# Patient Record
Sex: FEMALE | Race: WHITE | NOT HISPANIC OR LATINO | Employment: OTHER | URBAN - METROPOLITAN AREA
[De-identification: names, ages, dates, MRNs, and addresses within clinical notes are randomized per-mention and may not be internally consistent; named-entity substitution may affect disease eponyms.]

---

## 2017-10-10 ENCOUNTER — ALLIED HEALTH/NURSE VISIT (OUTPATIENT)
Dept: NURSING | Facility: CLINIC | Age: 37
End: 2017-10-10
Payer: COMMERCIAL

## 2017-10-10 DIAGNOSIS — Z23 NEED FOR PROPHYLACTIC VACCINATION AND INOCULATION AGAINST INFLUENZA: Primary | ICD-10-CM

## 2017-10-10 PROCEDURE — 90686 IIV4 VACC NO PRSV 0.5 ML IM: CPT

## 2017-10-10 PROCEDURE — 99207 ZZC NO CHARGE NURSE ONLY: CPT

## 2017-10-10 PROCEDURE — 90471 IMMUNIZATION ADMIN: CPT

## 2017-10-10 NOTE — MR AVS SNAPSHOT
"              After Visit Summary   10/10/2017    Chapis Zepeda    MRN: [id number]           Patient Information     Date Of Birth          1980        Visit Information        Provider Department      10/10/2017 1:00 PM BM NURSE [facility]        Today's Diagnoses     Need for prophylactic vaccination and inoculation against influenza    -  1       Follow-ups after your visit        Your next 10 appointments already scheduled     Oct 10, 2017  1:00 PM CDT   Nurse Only with BM NURSE   [facility]    [address]  [facility] [address] [phone]              Who to contact     If you have questions or need follow up information about today's clinic visit or your schedule please contact [facility] directly at [phone].  Normal or non-critical lab and imaging results will be communicated to you by Mobile Content [employer], letter or phone within 4 business days after the clinic has received the results. If you do not hear from us within 7 days, please contact the clinic through [employer] or phone. If you have a critical or abnormal lab result, we will notify you by phone as soon as possible.  Submit refill requests through [employer] or call your pharmacy and they will forward the refill request to us. Please allow 3 business days for your refill to be completed.          Additional Information About Your Visit        MyChart Information     [employer] lets you send messages to your doctor, view your test results, renew your prescriptions, schedule appointments and more. To sign up, go to [email or web address] . Click on \"Log in\" on the left side of the screen, which will take you to the Welcome page. Then click on \"Sign up Now\" on the right side of the page.     You will be asked to enter the access code listed below, as well as some personal information. " Please follow the directions to create your username and password.     Your access code is: [email or web address]  Expires: 2018 12:52 PM     Your access code will  in 90 days. If you need help or a new code, please call your [place] clinic or [phone].        Care EveryWhere ID     This is your Care EveryWhere ID. This could be used by other organizations to access your [place] medical records  [id number]         Blood Pressure from Last 3 Encounters:   16 111/60   10/30/12 111/71    Weight from Last 3 Encounters:   16 150 lb (68 kg)   10/30/12 145 lb 4 oz (65.9 kg)              We Performed the Following     FLU VAC, SPLIT VIRUS IM > 3 YO (QUADRIVALENT) [43892]     Vaccine Administration, Initial [92706]        Primary Care Provider    None Specified       No primary provider on file.        Equal Access to Services     [facility]: Hadii estella shino Marylin, waaxda luqadaha, qaybta kaalmada ademehrdadyada, marina rodriges . So [facility] [phone].    ATENCIÓN: Si habla español, tiene a garcia disposición servicios gratuitos de asistencia lingüística. hCristopher al [phone].    We comply with applicable federal civil rights laws and [place] laws. We do not discriminate on the basis of race, color, national origin, age, disability, sex, sexual orientation, or gender identity.            Thank you!     Thank you for choosing [facility]  for your care. Our goal is always to provide you with excellent care. Hearing back from our patients is one way we can continue to improve our services. Please take a few minutes to complete the written survey that you may receive in the mail after your visit with us. Thank you!             Your Updated Medication List - Protect others around you: Learn how to safely use, store and throw away your medicines at www.disposemymeds.org.          This list is accurate as of: 10/10/17 12:52 PM.  Always use your  most recent med list.                   Brand Name Dispense Instructions for use Diagnosis    doxylamine 25 MG Tabs tablet    UNISOM     Take 25 mg by mouth At Bedtime        VITAMIN E/FOLIC ACID/B-6/B-12 PO

## 2017-10-10 NOTE — PROGRESS NOTES
Injectable Influenza Immunization Documentation    1.  Is the person to be vaccinated sick today?   No    2. Does the person to be vaccinated have an allergy to a component   of the vaccine?   No    3. Has the person to be vaccinated ever had a serious reaction   to influenza vaccine in the past?   No    4. Has the person to be vaccinated ever had Guillain-Barré syndrome?   No    Form completed by Tiffany SEBASTIAN

## 2018-05-23 ENCOUNTER — OFFICE VISIT (OUTPATIENT)
Dept: FAMILY MEDICINE | Facility: CLINIC | Age: 38
End: 2018-05-23
Payer: COMMERCIAL

## 2018-05-23 VITALS
OXYGEN SATURATION: 100 % | DIASTOLIC BLOOD PRESSURE: 68 MMHG | HEART RATE: 88 BPM | RESPIRATION RATE: 16 BRPM | WEIGHT: 154.6 LBS | SYSTOLIC BLOOD PRESSURE: 114 MMHG | HEIGHT: 68 IN | TEMPERATURE: 98.3 F | BODY MASS INDEX: 23.43 KG/M2

## 2018-05-23 DIAGNOSIS — R07.0 THROAT PAIN: Primary | ICD-10-CM

## 2018-05-23 LAB
DEPRECATED S PYO AG THROAT QL EIA: NORMAL
SPECIMEN SOURCE: NORMAL

## 2018-05-23 PROCEDURE — 87081 CULTURE SCREEN ONLY: CPT | Performed by: FAMILY MEDICINE

## 2018-05-23 PROCEDURE — 99213 OFFICE O/P EST LOW 20 MIN: CPT | Performed by: FAMILY MEDICINE

## 2018-05-23 PROCEDURE — 87880 STREP A ASSAY W/OPTIC: CPT | Performed by: FAMILY MEDICINE

## 2018-05-23 NOTE — MR AVS SNAPSHOT
"              After Visit Summary   2018    Chapis Zepeda    MRN: [id number]           Patient Information     Date Of Birth          1980        Visit Information        Provider Department      2018 9:30 AM Phyllis Jenkins MD [facility]        Today's Diagnoses     Throat pain    -  1       Follow-ups after your visit        Who to contact     If you have questions or need follow up information about today's clinic visit or your schedule please contact [facility] directly at [phone].  Normal or non-critical lab and imaging results will be communicated to you by [employer], letter or phone within 4 business days after the clinic has received the results. If you do not hear from us within 7 days, please contact the clinic through [employer] or phone. If you have a critical or abnormal lab result, we will notify you by phone as soon as possible.  Submit refill requests through [employer] or call your pharmacy and they will forward the refill request to us. Please allow 3 business days for your refill to be completed.          Additional Information About Your Visit        MyChart Information     [employer] lets you send messages to your doctor, view your test results, renew your prescriptions, schedule appointments and more. To sign up, go to [email or web address] . Click on \"Log in\" on the left side of the screen, which will take you to the Welcome page. Then click on \"Sign up Now\" on the right side of the page.     You will be asked to enter the access code listed below, as well as some personal information. Please follow the directions to create your username and password.     Your access code is: [email or web address]  Expires: 2018 10:06 AM     Your access code will  in 90 days. If you need help or a new code, please call your [facility] or [phone].        Care EveryWhere ID     This is your Care EveryWhere ID. This could be used by other organizations to access your " "[place] medical records  [id number]        Your Vitals Were     Pulse Temperature Respirations Height Pulse Oximetry Breastfeeding?    88 98.3  F (36.8  C) (Oral) 16 5' 8\" (1.727 m) 100% No    BMI (Body Mass Index)                   23.51 kg/m2            Blood Pressure from Last 3 Encounters:   05/23/18 114/68   05/04/16 111/60   10/30/12 111/71    Weight from Last 3 Encounters:   05/23/18 154 lb 9.6 oz (70.1 kg)   05/04/16 150 lb (68 kg)   10/30/12 145 lb 4 oz (65.9 kg)              We Performed the Following     Beta strep group A culture     Rapid strep screen        Primary Care Provider Office Phone # Fax #    Annette Asuncion Meza [phone] [address] [address]        Equal Access to Services     [facility] : Hadii aad ku hadasho Soana, waaxda luqadaha, qaybta kaalmada ademehrdadyada, marina rodriges . So [facility] [phone].    ATENCIÓN: Si habla español, tiene a garcia disposición servicios gratuitos de asistencia lingüística. Llame al [phone].    We comply with applicable federal civil rights laws and [place] laws. We do not discriminate on the basis of race, color, national origin, age, disability, sex, sexual orientation, or gender identity.            Thank you!     Thank you for choosing [facility]  for your care. Our goal is always to provide you with excellent care. Hearing back from our patients is one way we can continue to improve our services. Please take a few minutes to complete the written survey that you may receive in the mail after your visit with us. Thank you!             Your Updated Medication List - Protect others around you: Learn how to safely use, store and throw away your medicines at www.disposemymeds.org.          This list is accurate as of 5/23/18 10:06 AM.  Always use your most recent med list.                   Brand Name Dispense Instructions for use Diagnosis    doxylamine 25 MG Tabs tablet    " UNISOM     Take 25 mg by mouth At Bedtime        SPIRONOLACTONE PO           VITAMIN E/FOLIC ACID/B-6/B-12 PO

## 2018-05-23 NOTE — PROGRESS NOTES
SUBJECTIVE:   Chapis Zepeda is a 37 year old female who presents to clinic today for the following health issues:      Chief Complaint   Patient presents with     Pharyngitis     Pt's whole family has recently had strep- pt has had a sore throat x5 days         SORE THROAT      Duration: 5 days    Description (location/character/radiation): throat    Intensity:  moderate    Accompanying signs and symptoms: none    History (similar episodes/previous evaluation): None    Precipitating or alleviating factors: whole family has recently had strep    Therapies tried and outcome: None       Problem list and histories reviewed & adjusted, as indicated.  Additional history: as documented    Patient Active Problem List   Diagnosis     Seasonal allergic rhinitis     History reviewed. No pertinent surgical history.    Social History   Substance Use Topics     Smoking status: Never Smoker     Smokeless tobacco: Never Used     Alcohol use Yes     Family History   Problem Relation Age of Onset     CANCER Maternal Grandmother      Lung     CANCER Maternal Grandfather      HEART DISEASE Paternal Grandfather          Current Outpatient Prescriptions   Medication Sig Dispense Refill     doxylamine (UNISOM) 25 MG TABS Take 25 mg by mouth At Bedtime       Multiple Vitamin (VITAMIN E/FOLIC ACID/B-6/B-12 PO)        SPIRONOLACTONE PO        No Known Allergies  Recent Labs   Lab Test  05/04/16   2245   CR  0.70   GFRESTIMATED  >90  Non  GFR Calc     GFRESTBLACK  >90   GFR Calc     POTASSIUM  3.7      BP Readings from Last 3 Encounters:   05/23/18 114/68   05/04/16 111/60   10/30/12 111/71    Wt Readings from Last 3 Encounters:   05/23/18 154 lb 9.6 oz (70.1 kg)   05/04/16 150 lb (68 kg)   10/30/12 145 lb 4 oz (65.9 kg)                  Labs reviewed in EPIC    Reviewed and updated as needed this visit by clinical staff  Tobacco  Allergies  Meds  Med Hx  Surg Hx  Fam Hx  Soc Hx      Reviewed and  "updated as needed this visit by Provider         ROS:  Constitutional, HEENT, cardiovascular, pulmonary, GI, , musculoskeletal, neuro, skin, endocrine and psych systems are negative, except as otherwise noted.    OBJECTIVE:     /68  Pulse 88  Temp 98.3  F (36.8  C) (Oral)  Resp 16  Ht 5' 8\" (1.727 m)  Wt 154 lb 9.6 oz (70.1 kg)  SpO2 100%  Breastfeeding? No  BMI 23.51 kg/m2  Body mass index is 23.51 kg/(m^2).  GENERAL: healthy, alert and no distress  EYES: Eyes grossly normal to inspection, PERRL and conjunctivae and sclerae normal  HENT: ear canals and TM's normal, nose and mouth without ulcers or lesions  NECK: no adenopathy, no asymmetry, masses, or scars and thyroid normal to palpation  RESP: lungs clear to auscultation - no rales, rhonchi or wheezes  CV: regular rate and rhythm, normal S1 S2, no S3 or S4, no murmur, click or rub, no peripheral edema and peripheral pulses strong  ABDOMEN: soft, nontender, no hepatosplenomegaly, no masses and bowel sounds normal  MS: no gross musculoskeletal defects noted, no edema    Diagnostic Test Results:  Results for orders placed or performed in visit on 05/23/18 (from the past 24 hour(s))   Rapid strep screen   Result Value Ref Range    Specimen Description Throat     Rapid Strep A Screen       NEGATIVE: No Group A streptococcal antigen detected by immunoassay, await culture report.       ASSESSMENT/PLAN:         1. 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  - Rapid strep screen  - Beta strep group A culture    RTC if no improving or worsening.  Pt is aware  and comfortable with the current plan.      Phyllis Jenkins MD  [facility]  "

## 2018-05-24 LAB
BACTERIA SPEC CULT: NORMAL
SPECIMEN SOURCE: NORMAL

## 2018-05-28 ENCOUNTER — HEALTH MAINTENANCE LETTER (OUTPATIENT)
Age: 38
End: 2018-05-28

## 2018-11-29 ENCOUNTER — OFFICE VISIT (OUTPATIENT)
Dept: FAMILY MEDICINE | Facility: CLINIC | Age: 38
End: 2018-11-29
Payer: COMMERCIAL

## 2018-11-29 VITALS
OXYGEN SATURATION: 98 % | WEIGHT: 152.7 LBS | HEART RATE: 77 BPM | HEIGHT: 68 IN | DIASTOLIC BLOOD PRESSURE: 73 MMHG | TEMPERATURE: 98.1 F | BODY MASS INDEX: 23.14 KG/M2 | SYSTOLIC BLOOD PRESSURE: 114 MMHG | RESPIRATION RATE: 16 BRPM

## 2018-11-29 DIAGNOSIS — N63.0 BREAST NODULE: ICD-10-CM

## 2018-11-29 DIAGNOSIS — Z00.00 ROUTINE HISTORY AND PHYSICAL EXAMINATION OF ADULT: Primary | ICD-10-CM

## 2018-11-29 NOTE — NURSING NOTE
"38 year old  Chief Complaint   Patient presents with     Physical     PAP,  \"BB\" size spot in right breast, noticed Tues, concerns about past hernia       Blood pressure 114/73, pulse 77, temperature 98.1  F (36.7  C), temperature source Oral, resp. rate 16, height 5' 8\" (172.7 cm), weight 152 lb 11.2 oz (69.3 kg), last menstrual period 11/25/2018, SpO2 98 %, not currently breastfeeding. Body mass index is 23.22 kg/(m^2).  BP completed using cuff size:    Patient Active Problem List   Diagnosis     Seasonal allergic rhinitis       Wt Readings from Last 2 Encounters:   11/29/18 152 lb 11.2 oz (69.3 kg)   05/23/18 154 lb 9.6 oz (70.1 kg)     BP Readings from Last 3 Encounters:   11/29/18 114/73   05/23/18 114/68   05/04/16 111/60       No Known Allergies    Current Outpatient Prescriptions   Medication     SPIRONOLACTONE PO     doxylamine (UNISOM) 25 MG TABS     Multiple Vitamin (VITAMIN E/FOLIC ACID/B-6/B-12 PO)     No current facility-administered medications for this visit.        Social History   Substance Use Topics     Smoking status: Never Smoker     Smokeless tobacco: Never Used     Alcohol use Yes         Honoring Choices - Health Care Directive Guide offered to patient at time of visit.    Health Maintenance Due   Topic Date Due     PHQ-2 Q1 YR  07/01/1992     TETANUS IMMUNIZATION (SYSTEM ASSIGNED)  07/01/1998     HIV SCREEN (SYSTEM ASSIGNED)  07/01/1998     PAP SCREENING Q3 YR (SYSTEM ASSIGNED)  07/01/2001     INFLUENZA VACCINE (1) 09/01/2018       Immunization History   Administered Date(s) Administered     Influenza Vaccine IM 3yrs+ 4 Valent IIV4 10/10/2017       No results found for: PAP      Recent Labs   Lab Test  05/04/16   2245   CR  0.70   GFRESTIMATED  >90  Non  GFR Calc     GFRESTBLACK  >90   GFR Calc     POTASSIUM  3.7       PHQ-2 ( 1999 Pfizer) 11/29/2018   Q1: Little interest or pleasure in doing things 0   Q2: Feeling down, depressed or hopeless 0   PHQ-2 Score " 0       No flowsheet data found.    No flowsheet data found.    No flowsheet data found.    Desiree Cat CMA  November 29, 2018 2:10 PM

## 2018-11-29 NOTE — MR AVS SNAPSHOT
After Visit Summary   2018    Chapis Zepeda    MRN: [id number]           Patient Information     Date Of Birth          1980        Visit Information        Provider Department      2018 2:00 PM Amy Sullivan, NP [facility]        Today's Diagnoses     Routine history and physical examination of adult    -  1       Follow-ups after your visit        Future tests that were ordered for you today     Open Future Orders        Priority Expected Expires Ordered    Glucose Fasting (AP [facility]) Routine  2019    MA Diagnostic Digital Bilateral Routine  2019    Lipid panel reflex to direct LDL Fasting Routine  2019            Who to contact     Please call your clinic at [phone] to:    Ask questions about your health    Make or cancel appointments    Discuss your medicines    Learn about your test results    Speak to your doctor            Additional Information About Your Visit        MyChart Information     [employer] is an electronic gateway that provides easy, online access to your medical records. With [employer], you can request a clinic appointment, read your test results, renew a prescription or communicate with your care team.     To sign up for [employer] Systemst visit the website at [email or web address]   You will be asked to enter the access code listed below, as well as some personal information. Please follow the directions to create your username and password.     Your access code is: [email or web address]  Expires: 2019  3:03 PM     Your access code will  in 90 days. If you need help or a new code, please contact your [facility] or call [phone] for assistance.        Care EveryWhere ID     This is your Care EveryWhere ID. This could be used by other organizations to access your [place] medical records  [id number]        Your Vitals Were     Pulse Temperature Respirations  "Height Last Period Pulse Oximetry    77 98.1  F (36.7  C) (Oral) 16 5' 8\" (172.7 cm) 11/25/2018 98%    BMI (Body Mass Index)                   23.22 kg/m2            Blood Pressure from Last 3 Encounters:   11/29/18 114/73   05/23/18 114/68   05/04/16 111/60    Weight from Last 3 Encounters:   11/29/18 152 lb 11.2 oz (69.3 kg)   05/23/18 154 lb 9.6 oz (70.1 kg)   05/04/16 150 lb (68 kg)              We Performed the Following     C FLU VAC QUADRIVALENT SPLIT VIRUS 3+YRS IM        Primary Care Provider Office Phone # Fax #    Annette Meza [phone] [address] [address]        Equal Access to Services     STEPHANIE WHITEHEAD : Hadii aad ku hadashagnieszka Soana, waaxda luqadaha, qaybta kaalmada shyam, marina rodriges . So [facility] [phone].    ATENCIÓN: Si habla español, tiene a garcia disposición servicios gratuitos de asistencia lingüística. Christopher al [phone].    We comply with applicable federal civil rights laws and [place] laws. We do not discriminate on the basis of race, color, national origin, age, disability, sex, sexual orientation, or gender identity.            Thank you!     Thank you for choosing [facility]  for your care. Our goal is always to provide you with excellent care. Hearing back from our patients is one way we can continue to improve our services. Please take a few minutes to complete the written survey that you may receive in the mail after your visit with us. Thank you!             Your Updated Medication List - Protect others around you: Learn how to safely use, store and throw away your medicines at www.disposemymeds.org.          This list is accurate as of 11/29/18  3:03 PM.  Always use your most recent med list.                   Brand Name Dispense Instructions for use Diagnosis    doxylamine 25 MG Tabs tablet    UNISOM     Take 25 mg by mouth At Bedtime        SPIRONOLACTONE PO      Take 75 mg by mouth daily "        VITAMIN E/FOLIC ACID/B-6/B-12 PO

## 2018-11-29 NOTE — LETTER
Ashutosh Nation,    December 6, 2018       TO: Chapis Lew  [address] [address]       DearMs.Duane,    We are writing to inform you of your test results.    Your test results fall within the expected range(s) or remain unchanged from previous results.  Please continue with current treatment plan.    Resulted Orders   Pap imaged thin layer screen with HPV - recommended age 30 - 65 years (select HPV order below)   Result Value Ref Range    PAP NIL     Copath Report         Patient Name: CHAPIS LEW  MR#: [id number]  Specimen #: [id number]  Collected: 11/29/2018  Received: 11/30/2018  Reported: 12/4/2018 08:45  Ordering Phy(s): PORFIRIO HAWK    For improved result formatting, select 'View Enhanced Report Format' under   Linked Documents section.    SPECIMEN/STAIN PROCESS:  Pap imaged thin layer prep screening (Surepath, FocalPoint with guided   screening)       Pap-Cyto x 1, HPV ordered x 1    SOURCE: Cervical, endocervical  ----------------------------------------------------------------   Pap imaged thin layer prep screening (Surepath, FocalPoint with guided   screening)  SPECIMEN ADEQUACY:  Satisfactory for evaluation.  -Transformation zone component present.    CYTOLOGIC INTERPRETATION:    Negative for intraepithelial lesion or malignancy    Electronically signed out by:  STEVIE Shaffer (ASCP)    Processed and screened at [facility]    CLINICAL HISTORY:  LMP: 11/25/2018    Papanicolaou T est Limitations:  Cervical cytology is a screening test with   limited sensitivity; regular  screening is critical for cancer prevention; Pap tests are primarily   effective for the diagnosis/prevention of  squamous cell carcinoma, not adenocarcinomas or other cancers.    TESTING LAB LOCATION:  [facility], [address]  [address] [phone]    COLLECTION  SITE:  Client:  [facility], [place]  Location: APKEVIN(B)       HPV High Risk Types DNA Cervical   Result Value Ref Range    HPV Source SurePath     HPV 16 DNA Negative NEG^Negative    HPV 18 DNA Negative NEG^Negative    Other HR HPV Negative NEG^Negative    Final Diagnosis This patient's sample is negative for HPV DNA.       Comment:      This test was developed and its performance characteristics determined by the   [facility]. It   has not been cleared or approved by the FDA. The laboratory is regulated under   CLIA as qualified to perform high-complexity testing. This test is used for   clinical purposes. It should not be regarded as investigational or for   research.  (Note)  METHODOLOGY:  The Roche agueda 4800 system uses automated extraction,   simultaneous amplification of HPV (L1 region) and beta-globin,    followed by  real time detection of fluorescent labeled HPV and beta   globin using specific oligonucleotide probes . The test specifically   identifies types HPV 16 DNA and HPV 18 DNA while concurrently   detecting the rest of the high risk types (31, 33, 35, 39, 45, 51,   52, 56, 58, 59, 66 or 68).  COMMENTS:  This test is not intended for use as a screening device   for women under age 30 with normal cervical cytology.  Results should   be correl  ated with cytologic and histologic findings. Close clinical   followup is recommended.      Specimen Description Cervical Cells       Comment:      [id number]       This looks good, will be in touch with other when mammogram and US done.    Thanks.    Amy

## 2018-11-29 NOTE — PROGRESS NOTES
"Chapis Zepeda is a 38 year old female presenting for an annual physical.      She reports she noticed a lump in her right breast a few days ago. States she has \"cystic breasts.\" Denies skin changes to breast or nipple discharge. Right nipple is inverted normally. She has no other concerns today.     Patient Active Problem List    Diagnosis Date Noted     Seasonal allergic rhinitis 10/30/2012     Priority: Medium       Family History   Problem Relation Age of Onset     Cancer Maternal Grandmother      Lung     Cancer Maternal Grandfather      Diabetes Maternal Grandfather      Heart Disease Paternal Grandfather      Anxiety Disorder Father      Depression Father      Substance Abuse Father      Hypertension No family hx of      Hyperlipidemia No family hx of      Breast Cancer No family hx of      Colon Cancer No family hx of      Prostate Cancer No family hx of      Surgical hx: umbilical repair with mesh 2008    Social History     Social History     Marital status:      Spouse name: N/A     Number of children: N/A     Years of education: N/A     Occupational History     Not on file.     Social History Main Topics     Smoking status: Never Smoker     Smokeless tobacco: Never Used     Alcohol use Yes     Drug use: No     Sexual activity: Yes     Partners: Male     Other Topics Concern     Not on file     Social History Narrative       REVIEW OF SYSTEMS:    Ears/Nose/Throat: negative    Dental exam: last 6 months ago    Eyes: negative.  Last eye exam: unknown.     Respiratory: negative    Cardiovascular: negative    Gastrointestinal: negative    Genitourinary: negative    Musculoskeletal: negative      Neurologic: negative     Skin: noticed a lump in her right breast a few days ago     Psychiatric: negative     Hematologic/Lymphatic/Immunologic:  negative      Endocrine:  negative     GYN: Periods regular, no dysmenorrhea, no dyspareunia.  No concerns for STD's. Has not had a mammogram    OBJECTIVE:    HEENT:  " Normocephalic, non-tender.  Eyes PERRLA, EOMI,           fundascopic negative.  Ears TM's are pearly.  Nose patent.      Throat without erythema.         NECK:  Supple with no adenopathy or thyromegaly.       LUNGS:  Clear to auscultation.     HEART:  Regular rhythm and rate, normal S1, S2, without murmur,       rub, click, or gallop.     ABDOMEN:  Bowel sounds are present throughout.  The abdomen is     soft, nontender, no hepatosplenomegaly or masses appreciated.    NEURO:  CN II- XII grossly intact.  DTR's equal upper and lower     extremities.    SKIN:  without lesions.    MSK:  5/5 strength to bilateral upper and lower extremities.      BREAST EXAM: 0.5x0.3mm hard fixed lesion noted directly under right nipple. Right nipple is inverted. No skin changes. NO adenopathy palpated.     PELVIC EXAM:  EG negative for lesion or foreign body.  Vaginal     mucosa is moist and pink without discharge.  Cervix is    without lesions.  Pap smear and endocervical sampling obtained     without incident.  Bimanual exam reveals firm, mobile uterus     without CMT.  Adenexa are mobile and non-tender bilaterally.      ASSESSMENT:  Health maintenance exam  Breast nodule      PLAN:    Contraception methods discussed- plans for  to have vasectomy   Flu shot recommended  Bilateral diagnostic mammogram with right breast US  Will connect with patient when breast imaging complete.

## 2018-11-29 NOTE — NURSING NOTE
"Injectable Influenza Immunization Documentation    1.  Has the patient received the information for the injectable influenza vaccine? YES     2. Is the patient 6 months of age or older? YES     3. Does the patient have any of the following contraindications?         Severe allergy to eggs? No     Severe allergic reaction to previous influenza vaccines? No   Severe allergy to latex? No       History of Guillain-Nettleton syndrome? No     Currently have a temperature greater than 100.4F? No        4.  Severely egg allergic patients should have flu vaccine eligibility assessed by an MD, RN, or pharmacist, and those who received flu vaccine should be observed for 15 min by an MD, RN, Pharmacist, Medical Technician, or member of clinic staff.\": YES    5. Latex-allergic patients should be given latex-free influenza vaccine Yes. Please reference the Vaccine latex table to determine if your clinic s product is latex-containing.       Vaccination given by Tami Thomas [facility]        "

## 2018-12-04 LAB
COPATH REPORT: NORMAL
PAP: NORMAL

## 2018-12-05 LAB
FINAL DIAGNOSIS: NORMAL
HPV HR 12 DNA CVX QL NAA+PROBE: NEGATIVE
HPV16 DNA SPEC QL NAA+PROBE: NEGATIVE
HPV18 DNA SPEC QL NAA+PROBE: NEGATIVE
SPECIMEN DESCRIPTION: NORMAL
SPECIMEN SOURCE CVX/VAG CYTO: NORMAL

## 2018-12-06 ENCOUNTER — RADIANT APPOINTMENT (OUTPATIENT)
Dept: MAMMOGRAPHY | Facility: CLINIC | Age: 38
End: 2018-12-06
Payer: COMMERCIAL

## 2018-12-06 DIAGNOSIS — R23.4 BREAST SKIN CHANGES: ICD-10-CM

## 2019-07-16 ENCOUNTER — MYC MEDICAL ADVICE (OUTPATIENT)
Dept: FAMILY MEDICINE | Facility: CLINIC | Age: 39
End: 2019-07-16

## 2020-02-13 ENCOUNTER — OFFICE VISIT (OUTPATIENT)
Dept: FAMILY MEDICINE | Facility: CLINIC | Age: 40
End: 2020-02-13
Payer: COMMERCIAL

## 2020-02-13 VITALS
DIASTOLIC BLOOD PRESSURE: 66 MMHG | BODY MASS INDEX: 24.1 KG/M2 | RESPIRATION RATE: 16 BRPM | OXYGEN SATURATION: 100 % | HEART RATE: 66 BPM | SYSTOLIC BLOOD PRESSURE: 105 MMHG | WEIGHT: 159 LBS | HEIGHT: 68 IN | TEMPERATURE: 98 F

## 2020-02-13 DIAGNOSIS — G24.5 EYE TWITCH: ICD-10-CM

## 2020-02-13 DIAGNOSIS — Z00.00 ROUTINE HISTORY AND PHYSICAL EXAMINATION OF ADULT: Primary | ICD-10-CM

## 2020-02-13 LAB
ANION GAP SERPL CALCULATED.3IONS-SCNC: 4 MMOL/L (ref 3–14)
BASOPHILS # BLD AUTO: 0.1 10E9/L (ref 0–0.2)
BASOPHILS NFR BLD AUTO: 0.9 %
BUN SERPL-MCNC: 12 MG/DL (ref 7–30)
CALCIUM SERPL-MCNC: 9 MG/DL (ref 8.5–10.1)
CHLORIDE SERPL-SCNC: 105 MMOL/L (ref 94–109)
CHOLEST SERPL-MCNC: 145 MG/DL
CO2 SERPL-SCNC: 30 MMOL/L (ref 20–32)
CREAT SERPL-MCNC: 0.94 MG/DL (ref 0.52–1.04)
DIFFERENTIAL METHOD BLD: NORMAL
EOSINOPHIL # BLD AUTO: 0.1 10E9/L (ref 0–0.7)
EOSINOPHIL NFR BLD AUTO: 2.5 %
ERYTHROCYTE [DISTWIDTH] IN BLOOD BY AUTOMATED COUNT: 11.6 % (ref 10–15)
GFR SERPL CREATININE-BSD FRML MDRD: 76 ML/MIN/{1.73_M2}
GLUCOSE SERPL-MCNC: 86 MG/DL (ref 70–99)
HCT VFR BLD AUTO: 44.1 % (ref 35–47)
HDLC SERPL-MCNC: 57 MG/DL
HGB BLD-MCNC: 15 G/DL (ref 11.7–15.7)
IMM GRANULOCYTES # BLD: 0 10E9/L (ref 0–0.4)
IMM GRANULOCYTES NFR BLD: 0.2 %
LDLC SERPL CALC-MCNC: 80 MG/DL
LYMPHOCYTES # BLD AUTO: 1.3 10E9/L (ref 0.8–5.3)
LYMPHOCYTES NFR BLD AUTO: 25 %
MCH RBC QN AUTO: 32.6 PG (ref 26.5–33)
MCHC RBC AUTO-ENTMCNC: 34 G/DL (ref 31.5–36.5)
MCV RBC AUTO: 96 FL (ref 78–100)
MONOCYTES # BLD AUTO: 0.5 10E9/L (ref 0–1.3)
MONOCYTES NFR BLD AUTO: 8.5 %
NEUTROPHILS # BLD AUTO: 3.3 10E9/L (ref 1.6–8.3)
NEUTROPHILS NFR BLD AUTO: 62.9 %
NONHDLC SERPL-MCNC: 88 MG/DL
NRBC # BLD AUTO: 0 10*3/UL
NRBC BLD AUTO-RTO: 0 /100
PLATELET # BLD AUTO: 177 10E9/L (ref 150–450)
POTASSIUM SERPL-SCNC: 4.9 MMOL/L (ref 3.4–5.3)
RBC # BLD AUTO: 4.6 10E12/L (ref 3.8–5.2)
SODIUM SERPL-SCNC: 139 MMOL/L (ref 133–144)
TRIGL SERPL-MCNC: 41 MG/DL
TSH SERPL DL<=0.005 MIU/L-ACNC: 1.15 MU/L (ref 0.4–4)
WBC # BLD AUTO: 5.3 10E9/L (ref 4–11)

## 2020-02-13 SDOH — HEALTH STABILITY: MENTAL HEALTH: HOW OFTEN DO YOU HAVE A DRINK CONTAINING ALCOHOL?: 2-4 TIMES A MONTH

## 2020-02-13 SDOH — HEALTH STABILITY: MENTAL HEALTH: HOW OFTEN DO YOU HAVE 6 OR MORE DRINKS ON ONE OCCASION?: NEVER

## 2020-02-13 SDOH — HEALTH STABILITY: MENTAL HEALTH: HOW MANY STANDARD DRINKS CONTAINING ALCOHOL DO YOU HAVE ON A TYPICAL DAY?: 1 OR 2

## 2020-02-13 ASSESSMENT — ANXIETY QUESTIONNAIRES
7. FEELING AFRAID AS IF SOMETHING AWFUL MIGHT HAPPEN: NOT AT ALL
3. WORRYING TOO MUCH ABOUT DIFFERENT THINGS: NOT AT ALL
GAD7 TOTAL SCORE: 0
2. NOT BEING ABLE TO STOP OR CONTROL WORRYING: NOT AT ALL
6. BECOMING EASILY ANNOYED OR IRRITABLE: NOT AT ALL
IF YOU CHECKED OFF ANY PROBLEMS ON THIS QUESTIONNAIRE, HOW DIFFICULT HAVE THESE PROBLEMS MADE IT FOR YOU TO DO YOUR WORK, TAKE CARE OF THINGS AT HOME, OR GET ALONG WITH OTHER PEOPLE: NOT DIFFICULT AT ALL
5. BEING SO RESTLESS THAT IT IS HARD TO SIT STILL: NOT AT ALL
1. FEELING NERVOUS, ANXIOUS, OR ON EDGE: NOT AT ALL

## 2020-02-13 ASSESSMENT — MIFFLIN-ST. JEOR: SCORE: 1449.48

## 2020-02-13 ASSESSMENT — PATIENT HEALTH QUESTIONNAIRE - PHQ9
5. POOR APPETITE OR OVEREATING: NOT AT ALL
SUM OF ALL RESPONSES TO PHQ QUESTIONS 1-9: 0

## 2020-02-13 NOTE — NURSING NOTE
"39 year old  Chief Complaint   Patient presents with     Physical     Pt. presents to the clinic today for a physical exam.        Blood pressure 105/66, pulse 66, temperature 98  F (36.7  C), temperature source Oral, resp. rate 16, height 1.735 m (5' 8.3\"), weight 72.1 kg (159 lb), last menstrual period 01/23/2020, SpO2 100 %, not currently breastfeeding. Body mass index is 23.96 kg/m .  BP completed using cuff size:    Patient Active Problem List   Diagnosis     Seasonal allergic rhinitis       Wt Readings from Last 2 Encounters:   02/13/20 72.1 kg (159 lb)   11/29/18 69.3 kg (152 lb 11.2 oz)     BP Readings from Last 3 Encounters:   02/13/20 105/66   11/29/18 114/73   05/23/18 114/68       No Known Allergies    Current Outpatient Medications   Medication     doxylamine (UNISOM) 25 MG TABS     Multiple Vitamin (VITAMIN E/FOLIC ACID/B-6/B-12 PO)     SPIRONOLACTONE PO     No current facility-administered medications for this visit.        Social History     Tobacco Use     Smoking status: Never Smoker     Smokeless tobacco: Never Used   Substance Use Topics     Alcohol use: Yes     Frequency: 2-4 times a month     Drinks per session: 1 or 2     Binge frequency: Never     Drug use: No       Honoring Choices - Health Care Directive Guide offered to patient at time of visit.    Health Maintenance Due   Topic Date Due     DTAP/TDAP/TD IMMUNIZATION (1 - Tdap) 07/01/1991     HIV SCREENING  07/01/1995     INFLUENZA VACCINE (1) 09/01/2019     PREVENTIVE CARE VISIT  11/29/2019     PHQ-2  01/01/2020       Immunization History   Administered Date(s) Administered     Influenza Vaccine IM > 6 months Valent IIV4 10/10/2017, 11/29/2018       Lab Results   Component Value Date    PAP NIL 11/29/2018         Recent Labs   Lab Test 05/04/16  2245   CR 0.70   GFRESTIMATED >90  Non  GFR Calc     GFRESTBLACK >90   GFR Calc     POTASSIUM 3.7       PHQ-2 ( 1999 Pfizer) 2/13/2020 11/29/2018   Q1: Little " interest or pleasure in doing things 0 0   Q2: Feeling down, depressed or hopeless 0 0   PHQ-2 Score 0 0       PHQ-9 SCORE 2/13/2020   PHQ-9 Total Score 0       KACEY-7 SCORE 2/13/2020   Total Score 0       No flowsheet data found.    Desiree Cat CMA  February 13, 2020 9:12 AM

## 2020-02-13 NOTE — PROGRESS NOTES
SUBJECTIVE:   CC: Chapis Zepeda is an 39 year old woman who presents for preventive health visit.   Chapis's only health concern today is a month's long issue with a twitch in her right eye.  She has no injury, no pain, no change in visual acuity.  No floaters.      Chapis has had a good year.  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  Family is good.    Healthy Habits:    Do you get at least three servings of calcium containing foods daily (dairy, green leafy vegetables, etc.)? yes    Amount of exercise or daily activities, outside of work: 7 day(s) per week, cardio and physically active    Problems taking medications regularly No    Medication side effects: No    Have you had an eye exam in the past two years? No, needs one for health concern    Do you see a dentist twice per year? yes    Do you have sleep apnea, excessive snoring or daytime drowsiness?no    Today's PHQ-2 Score:   PHQ-2 ( 1999 Pfizer) 2/13/2020 11/29/2018   Q1: Little interest or pleasure in doing things 0 0   Q2: Feeling down, depressed or hopeless 0 0   PHQ-2 Score 0 0       Abuse: Current or Past(Physical, Sexual or Emotional)- No  Do you feel safe in your environment? Yes        Social History     Tobacco Use     Smoking status: Never Smoker     Smokeless tobacco: Never Used   Substance Use Topics     Alcohol use: Yes     Frequency: 2-4 times a month     Drinks per session: 1 or 2     Binge frequency: Never     If you drink alcohol do you typically have >3 drinks per day or >7 drinks per week? No                     Reviewed orders with patient.  Reviewed health maintenance and updated orders accordingly - Yes  Lab work is in process    Mammogram not appropriate for this patient based on age.    Pertinent mammograms are reviewed under the imaging tab.  History of abnormal Pap smear: NO - age 30- 65 PAP every 3 years recommended  PAP / HPV Latest Ref Rng & Units 11/29/2018   PAP - NIL   HPV 16 DNA NEG:Negative Negative   HPV 18 DNA NEG:Negative Negative   OTHER HR  "HPV NEG:Negative Negative     Reviewed and updated as needed this visit by clinical staff  Tobacco  Allergies  Meds  Med Hx  Surg Hx  Fam Hx  Soc Hx        Reviewed and updated as needed this visit by Provider        Past Medical History:   Diagnosis Date     NO ACTIVE PROBLEMS         ROS:  CONSTITUTIONAL: NEGATIVE for fever, chills, change in weight  INTEGUMENTARU/SKIN: NEGATIVE for worrisome rashes, moles or lesions  EYES: NEGATIVE for vision changes or irritation.  Eye twitch as noted above  ENT: NEGATIVE for ear, mouth and throat problems  RESP: NEGATIVE for significant cough or SOB  BREAST: NEGATIVE for masses, tenderness or discharge  CV: NEGATIVE for chest pain, palpitations or peripheral edema  GI: NEGATIVE for nausea, abdominal pain, heartburn, or change in bowel habits  : NEGATIVE for unusual urinary or vaginal symptoms. Periods are regular.  MUSCULOSKELETAL: NEGATIVE for significant arthralgias or myalgia  NEURO: NEGATIVE for weakness, dizziness or paresthesias  PSYCHIATRIC: NEGATIVE for changes in mood or affect    OBJECTIVE:   /66 (BP Location: Left arm, Patient Position: Chair, Cuff Size: Adult Regular)   Pulse 66   Temp 98  F (36.7  C) (Oral)   Resp 16   Ht 1.735 m (5' 8.3\")   Wt 72.1 kg (159 lb)   LMP 01/23/2020 (Approximate)   SpO2 100%   BMI 23.96 kg/m    EXAM:  GENERAL: healthy, alert and no distress  EYES: Eyes grossly normal to inspection, PERRL and conjunctivae and sclerae normal  HENT: ear canals and TM's normal, nose and mouth without ulcers or lesions  NECK: no adenopathy, no asymmetry, masses, or scars and thyroid normal to palpation  RESP: lungs clear to auscultation - no rales, rhonchi or wheezes  BREAST: normal without masses, tenderness or nipple discharge and no palpable axillary masses or adenopathy  CV: regular rate and rhythm, normal S1 S2, no S3 or S4, no murmur, click or rub, no peripheral edema and peripheral pulses strong  ABDOMEN: soft, nontender, no " "hepatosplenomegaly, no masses and bowel sounds normal  MS: no gross musculoskeletal defects noted, no edema  SKIN: no suspicious lesions or rashes  NEURO: Normal strength and tone, mentation intact and speech normal  PSYCH: mentation appears normal, affect normal/bright    none     ASSESSMENT/PLAN:   1. Routine history and physical examination of adult    - Lipid panel reflex to direct LDL Fasting  - CBC with platelets differential  - Basic metabolic panel  - TSH    2. Eye twitch    - OPHTHALMOLOGY ADULT REFERRAL    COUNSELING:   Reviewed preventive health counseling, as reflected in patient instructions       Regular exercise       Healthy diet/nutrition       Vision screening       Immunizations    Vaccinated for: TDAP             Contraception    Estimated body mass index is 23.96 kg/m  as calculated from the following:    Height as of this encounter: 1.735 m (5' 8.3\").    Weight as of this encounter: 72.1 kg (159 lb).         reports that she has never smoked. She has never used smokeless tobacco.     Return to clinic one year and prn.      Counseling Resources:  ATP IV Guidelines  Pooled Cohorts Equation Calculator  Breast Cancer Risk Calculator  FRAX Risk Assessment  ICSI Preventive Guidelines  Dietary Guidelines for Americans, 2010  USDA's MyPlate  ASA Prophylaxis  Lung CA Screening    Amy Sullivan, NP  [facility]    "

## 2020-02-14 ASSESSMENT — ANXIETY QUESTIONNAIRES: GAD7 TOTAL SCORE: 0

## 2020-02-24 ENCOUNTER — HEALTH MAINTENANCE LETTER (OUTPATIENT)
Age: 40
End: 2020-02-24

## 2020-07-06 ENCOUNTER — VIRTUAL VISIT (OUTPATIENT)
Dept: FAMILY MEDICINE | Facility: OTHER | Age: 40
End: 2020-07-06

## 2020-07-06 NOTE — PROGRESS NOTES
"Date: 2020 09:04:12  Clinician: Kulwant Morales  Clinician NPI: [id number]  Patient: Chapis Zepeda  Patient : 1980  Patient Address: [address] [place] [id number]  Patient Phone: [phone]  Visit Protocol: URI  Patient Summary:  Chapis is a 40 year old ( : 1980 ) female who initiated a Visit for COVID-19 (Coronavirus) evaluation and screening. When asked the question \"Please sign me up to receive news, health information and promotions from [employer].\", Chapis responded \"Yes\".    Chapis states her symptoms started today.   Her symptoms consist of diarrhea and malaise. Chapis also feels feverish but was unable to measure her temperature.   Chapis denies having wheezing, nausea, teeth pain, ageusia, vomiting, rhinitis, ear pain, headache, chills, sore throat, myalgias, anosmia, facial pain or pressure, cough, and nasal congestion. She also denies having recent facial or sinus surgery in the past 60 days and taking antibiotic medication in the past month. She is not experiencing dyspnea.    Pertinent COVID-19 (Coronavirus) information  In the past 14 days, Chapis has not worked in a congregate living setting.   She does not work or volunteer as healthcare worker or a  and does not work or volunteer in a healthcare facility.   Chapis also has not lived in a congregate living setting in the past 14 days. She does not live with a healthcare worker.   Chapis has not had a close contact with a laboratory-confirmed COVID-19 patient within 14 days of symptom onset.   Pertinent medical history  Chapis does not get yeast infections when she takes antibiotics.   Chapis does not need a return to work/school note.   Weight: 158 lbs   Chapis does not smoke or use smokeless tobacco.   She denies pregnancy and denies breastfeeding. She is currently menstruating.   Additional information as reported by the patient (free text): One of my co-workers was exposed to a lab tested positive Covid individual last week. he " "just found out and is isolating as well.   Weight: 158 lbs    MEDICATIONS: Flonase Allergy Relief nasal, ALLERGIES: NKDA  Clinician Response:  Dear Chapis,   Your symptoms show that you may have coronavirus (COVID-19). This illness can cause fever, cough and trouble breathing. Many people get a mild case and get better on their own. Some people can get very sick.  What should I do?  We would like to test you for this virus.   1. Please call [phone] to schedule your visit. Explain that you were referred by [facility] to have a COVID-19 test. Be ready to share your [facility] visit ID number.  The following will serve as your written order for this COVID Test, ordered by me, for the indication of suspected COVID [Z20.828]: The test will be ordered in [employer], our electronic health record, after you are scheduled. It will show as ordered and authorized by Cuong Valentin MD.  Order: COVID-19 (Coronavirus) PCR for SYMPTOMATIC testing from [facility].      2. When it's time for your COVID test:  Stay at least 6 feet away from others. (If someone will drive you to your test, stay in the backseat, as far away from the  as you can.)   Cover your mouth and nose with a mask, tissue or washcloth.  Go straight to the testing site. Don't make any stops on the way there or back.      3.Starting now: Stay home and away from others (self-isolate) until:   You've had no fever---and no medicine that reduces fever---for 3 full days (72 hours). And...   Your other symptoms have gotten better. For example, your cough or breathing has improved. And...   At least 10 days have passed since your symptoms started.       During this time, don't leave the house except for testing or medical care.   Stay in your own room, even for meals. Use your own bathroom if you can.   Stay away from others in your home. No hugging, kissing or shaking hands. No visitors.  Don't go to work, school or anywhere else.    Clean \"high touch\" surfaces often (doorknobs, " counters, handles, etc.). Use a household cleaning spray or wipes. You'll find a full list of  on the EPA website: www.epa.gov/pesticide-registration/list-n-disinfectants-use-against-sars-cov-2.   Cover your mouth and nose with a mask, tissue or washcloth to avoid spreading germs.  Wash your hands and face often. Use soap and water.  Caregivers in these groups are at risk for severe illness due to COVID-19:  o People 65 years and older  o People who live in a nursing home or long-term care facility  o People with chronic disease (lung, heart, cancer, diabetes, kidney, liver, immunologic)  o People who have a weakened immune system, including those who:   Are in cancer treatment  Take medicine that weakens the immune system, such as corticosteroids  Had a bone marrow or organ transplant  Have an immune deficiency  Have poorly controlled HIV or AIDS  Are obese (body mass index of 40 or higher)  Smoke regularly   o Caregivers should wear gloves while washing dishes, handling laundry and cleaning bedrooms and bathrooms.  o Use caution when washing and drying laundry: Don't shake dirty laundry, and use the warmest water setting that you can.  o For more tips, go to www.cdc.gov/coronavirus/2019-ncov/downloads/10Things.pdf.    4.Sign up for [employer]. We know it's scary to hear that you might have COVID-19. We want to track your symptoms to make sure you're okay over the next 2 weeks. Please look for an email from [employer]---this is a free, online program that we'll use to keep in touch. To sign up, follow the link in the email. Learn more at [email or web address]  How can I take care of myself?   Get lots of rest. Drink extra fluids (unless a doctor has told you not to).   Take Tylenol (acetaminophen) for fever or pain. If you have liver or kidney problems, ask your family doctor if it's okay to take Tylenol.   Adults can take either:    650 mg (two 325 mg pills) every 4 to 6 hours, or...    1,000 mg (two 500 mg pills) every 8 hours as needed.    Note: Don't take more than 3,000 mg in one day. Acetaminophen is found in many medicines (both prescribed and over-the-counter medicines). Read all labels to be sure you don't take too much.   For children, check the Tylenol bottle for the right dose. The dose is based on the child's age or weight.    If you have other health problems (like cancer, heart failure, an organ transplant or severe kidney disease): Call your specialty clinic if you don't feel better in the next 2 days.       Know when to call 911. Emergency warning signs include:    Trouble breathing or shortness of breath Pain or pressure in the chest that doesn't go away Feeling confused like you haven't felt before, or not being able to wake up Bluish-colored lips or face.  Where can I get more information?   [facility] -- About COVID-19: [email or web address]   CDC -- What to Do If You're Sick: www.cdc.gov/coronavirus/2019-ncov/about/steps-when-sick.html   CDC -- Ending Home Isolation: www.cdc.gov/coronavirus/2019-ncov/hcp/disposition-in-home-patients.html   CDC -- Caring for Someone: www.cdc.gov/coronavirus/2019-ncov/if-you-are-sick/care-for-someone.html   [facility] -- Interim Guidance for Hospital Discharge to Home: www.health.[facility].mn.us/diseases/coronavirus/hcp/hospdischarge.pdf   [facility] clinical trials (COVID-19 research studies): clinicalaffairs.[facility]/n-clinical-trials    Below are the COVID-19 hotlines at the [place] Department of Health ([facility]). Interpreters are available.    For health questions: Call 165-945-0294 or 1-891.351.8348 (7 a.m. to 7 p.m.) For questions about schools and childcare: Call 151-114-4362 or 1-381.459.1750 (7 a.m. to 7 p.m.)    Diagnosis: Other malaise  Diagnosis ICD: R53.81

## 2020-12-13 ENCOUNTER — HEALTH MAINTENANCE LETTER (OUTPATIENT)
Age: 40
End: 2020-12-13

## 2021-04-17 ENCOUNTER — HEALTH MAINTENANCE LETTER (OUTPATIENT)
Age: 41
End: 2021-04-17

## 2021-09-26 ENCOUNTER — HEALTH MAINTENANCE LETTER (OUTPATIENT)
Age: 41
End: 2021-09-26

## 2021-09-27 ENCOUNTER — OFFICE VISIT (OUTPATIENT)
Dept: FAMILY MEDICINE | Facility: CLINIC | Age: 41
End: 2021-09-27
Payer: COMMERCIAL

## 2021-09-27 VITALS
TEMPERATURE: 98.5 F | HEART RATE: 62 BPM | SYSTOLIC BLOOD PRESSURE: 108 MMHG | DIASTOLIC BLOOD PRESSURE: 65 MMHG | OXYGEN SATURATION: 99 % | BODY MASS INDEX: 23.66 KG/M2 | HEIGHT: 68 IN | WEIGHT: 156.1 LBS

## 2021-09-27 DIAGNOSIS — N95.1 PERIMENOPAUSE: ICD-10-CM

## 2021-09-27 DIAGNOSIS — F32.A DEPRESSION, UNSPECIFIED DEPRESSION TYPE: ICD-10-CM

## 2021-09-27 DIAGNOSIS — Z00.00 ROUTINE HISTORY AND PHYSICAL EXAMINATION OF ADULT: Primary | ICD-10-CM

## 2021-09-27 LAB
ALBUMIN SERPL-MCNC: 4.2 G/DL (ref 3.4–5)
ALP SERPL-CCNC: 35 U/L (ref 40–150)
ALT SERPL W P-5'-P-CCNC: 19 U/L (ref 0–50)
ANION GAP SERPL CALCULATED.3IONS-SCNC: 7 MMOL/L (ref 3–14)
AST SERPL W P-5'-P-CCNC: 11 U/L (ref 0–45)
BASOPHILS # BLD AUTO: 0.1 10E3/UL (ref 0–0.2)
BASOPHILS NFR BLD AUTO: 1 %
BILIRUB SERPL-MCNC: 1 MG/DL (ref 0.2–1.3)
BUN SERPL-MCNC: 12 MG/DL (ref 7–30)
CALCIUM SERPL-MCNC: 8.9 MG/DL (ref 8.5–10.1)
CHLORIDE BLD-SCNC: 107 MMOL/L (ref 94–109)
CHOLEST SERPL-MCNC: 140 MG/DL
CO2 SERPL-SCNC: 26 MMOL/L (ref 20–32)
CREAT SERPL-MCNC: 0.85 MG/DL (ref 0.52–1.04)
EOSINOPHIL # BLD AUTO: 0 10E3/UL (ref 0–0.7)
EOSINOPHIL NFR BLD AUTO: 1 %
ERYTHROCYTE [DISTWIDTH] IN BLOOD BY AUTOMATED COUNT: 11.8 % (ref 10–15)
FASTING STATUS PATIENT QL REPORTED: NORMAL
FSH SERPL-ACNC: 8.2 IU/L
GFR SERPL CREATININE-BSD FRML MDRD: 85 ML/MIN/1.73M2
GLUCOSE BLD-MCNC: 78 MG/DL (ref 70–99)
HCT VFR BLD AUTO: 41.7 % (ref 35–47)
HDLC SERPL-MCNC: 56 MG/DL
HGB BLD-MCNC: 14.5 G/DL (ref 11.7–15.7)
IMM GRANULOCYTES # BLD: 0 10E3/UL
IMM GRANULOCYTES NFR BLD: 0 %
LDLC SERPL CALC-MCNC: 76 MG/DL
LYMPHOCYTES # BLD AUTO: 1.1 10E3/UL (ref 0.8–5.3)
LYMPHOCYTES NFR BLD AUTO: 13 %
MCH RBC QN AUTO: 33.7 PG (ref 26.5–33)
MCHC RBC AUTO-ENTMCNC: 34.8 G/DL (ref 31.5–36.5)
MCV RBC AUTO: 97 FL (ref 78–100)
MONOCYTES # BLD AUTO: 0.5 10E3/UL (ref 0–1.3)
MONOCYTES NFR BLD AUTO: 6 %
NEUTROPHILS # BLD AUTO: 6.6 10E3/UL (ref 1.6–8.3)
NEUTROPHILS NFR BLD AUTO: 79 %
NONHDLC SERPL-MCNC: 84 MG/DL
NRBC # BLD AUTO: 0 10E3/UL
NRBC BLD AUTO-RTO: 0 /100
PLATELET # BLD AUTO: 218 10E3/UL (ref 150–450)
POTASSIUM BLD-SCNC: 4 MMOL/L (ref 3.4–5.3)
PROT SERPL-MCNC: 7.2 G/DL (ref 6.8–8.8)
RBC # BLD AUTO: 4.3 10E6/UL (ref 3.8–5.2)
SODIUM SERPL-SCNC: 140 MMOL/L (ref 133–144)
TRIGL SERPL-MCNC: 41 MG/DL
TSH SERPL DL<=0.005 MIU/L-ACNC: 1.02 MU/L (ref 0.4–4)
VIT B12 SERPL-MCNC: 336 PG/ML (ref 193–986)
WBC # BLD AUTO: 8.3 10E3/UL (ref 4–11)

## 2021-09-27 PROCEDURE — 80053 COMPREHEN METABOLIC PANEL: CPT | Performed by: NURSE PRACTITIONER

## 2021-09-27 PROCEDURE — 82607 VITAMIN B-12: CPT | Performed by: NURSE PRACTITIONER

## 2021-09-27 PROCEDURE — 84443 ASSAY THYROID STIM HORMONE: CPT | Performed by: NURSE PRACTITIONER

## 2021-09-27 PROCEDURE — 85025 COMPLETE CBC W/AUTO DIFF WBC: CPT | Performed by: NURSE PRACTITIONER

## 2021-09-27 PROCEDURE — 80061 LIPID PANEL: CPT | Performed by: NURSE PRACTITIONER

## 2021-09-27 PROCEDURE — 83001 ASSAY OF GONADOTROPIN (FSH): CPT | Performed by: NURSE PRACTITIONER

## 2021-09-27 PROCEDURE — 82306 VITAMIN D 25 HYDROXY: CPT | Performed by: NURSE PRACTITIONER

## 2021-09-27 PROCEDURE — 83002 ASSAY OF GONADOTROPIN (LH): CPT | Performed by: NURSE PRACTITIONER

## 2021-09-27 ASSESSMENT — MIFFLIN-ST. JEOR: SCORE: 1421.56

## 2021-09-27 ASSESSMENT — PATIENT HEALTH QUESTIONNAIRE - PHQ9
5. POOR APPETITE OR OVEREATING: MORE THAN HALF THE DAYS
SUM OF ALL RESPONSES TO PHQ QUESTIONS 1-9: 9

## 2021-09-27 ASSESSMENT — ANXIETY QUESTIONNAIRES
7. FEELING AFRAID AS IF SOMETHING AWFUL MIGHT HAPPEN: MORE THAN HALF THE DAYS
6. BECOMING EASILY ANNOYED OR IRRITABLE: SEVERAL DAYS
IF YOU CHECKED OFF ANY PROBLEMS ON THIS QUESTIONNAIRE, HOW DIFFICULT HAVE THESE PROBLEMS MADE IT FOR YOU TO DO YOUR WORK, TAKE CARE OF THINGS AT HOME, OR GET ALONG WITH OTHER PEOPLE: NOT DIFFICULT AT ALL
5. BEING SO RESTLESS THAT IT IS HARD TO SIT STILL: SEVERAL DAYS
GAD7 TOTAL SCORE: 12
3. WORRYING TOO MUCH ABOUT DIFFERENT THINGS: NEARLY EVERY DAY
1. FEELING NERVOUS, ANXIOUS, OR ON EDGE: MORE THAN HALF THE DAYS
2. NOT BEING ABLE TO STOP OR CONTROL WORRYING: SEVERAL DAYS

## 2021-09-27 NOTE — NURSING NOTE
"ROOM:1    Preferred Name: Chapis     41 year old  Chief Complaint   Patient presents with     Physical     Depression       Blood pressure 108/65, pulse 62, temperature 98.5  F (36.9  C), temperature source Oral, height 1.727 m (5' 8\"), weight 70.8 kg (156 lb 1.6 oz), last menstrual period 09/16/2021, SpO2 99 %, not currently breastfeeding. Body mass index is 23.73 kg/m .      Patient Active Problem List   Diagnosis     Seasonal allergic rhinitis       Wt Readings from Last 2 Encounters:   09/27/21 70.8 kg (156 lb 1.6 oz)   02/13/20 72.1 kg (159 lb)     BP Readings from Last 3 Encounters:   09/27/21 108/65   02/13/20 105/66   11/29/18 114/73       No Known Allergies    Current Outpatient Medications   Medication     doxylamine (UNISOM) 25 MG TABS     Multiple Vitamin (VITAMIN E/FOLIC ACID/B-6/B-12 PO)     SPIRONOLACTONE PO     No current facility-administered medications for this visit.       Social History     Tobacco Use     Smoking status: Never Smoker     Smokeless tobacco: Never Used   Vaping Use     Vaping Use: Never used   Substance Use Topics     Alcohol use: Yes     Drug use: No       Honoring Choices - Health Care Directive Guide offered to patient at time of visit.    Health Maintenance Due   Topic Date Due     ADVANCE CARE PLANNING  Never done     PREVENTIVE CARE VISIT  02/13/2021     INFLUENZA VACCINE (1) 09/01/2021       Immunization History   Administered Date(s) Administered     COVID-19,PF,Zenaida 03/22/2021     Influenza Vaccine IM > 6 months Valent IIV4 (Alfuria,Fluzone) 10/10/2017, 11/29/2018, 10/27/2020     TDAP Vaccine (Boostrix) 02/13/2020       Lab Results   Component Value Date    PAP NIL 11/29/2018         Recent Labs   Lab Test 02/13/20  1004 09/08/16  1245 05/04/16  2245   LDL 80  --   --    HDL 57  --   --    TRIG 41  --   --    CR 0.94 0.62 0.70   GFRESTIMATED 76 >60 >90  Non  GFR Calc     GFRESTBLACK 88 >60 >90   GFR Calc     POTASSIUM 4.9  --  3.7   TSH " 1.15  --   --        PHQ-2 ( 1999 Pfizer) 2/13/2020 11/29/2018   Q1: Little interest or pleasure in doing things 0 0   Q2: Feeling down, depressed or hopeless 0 0   PHQ-2 Score 0 0       PHQ-9 SCORE 2/13/2020 9/27/2021   PHQ-9 Total Score 0 9       KACEY-7 SCORE 2/13/2020 9/27/2021   Total Score 0 12       No flowsheet data found.      Tami Thomas, [facility]  September 27, 2021 12:51 PM

## 2021-09-28 LAB — DEPRECATED CALCIDIOL+CALCIFEROL SERPL-MC: 46 UG/L (ref 20–75)

## 2021-09-28 ASSESSMENT — ANXIETY QUESTIONNAIRES: GAD7 TOTAL SCORE: 12

## 2021-09-29 NOTE — PROGRESS NOTES
"  Progress Note    SUBJECTIVE:  Chapis Zepeda is an 41 year old, who requests an Annual Preventive Exam.     Concerns today include:     Chapis is concerned that she is experiencing some depression symptoms.  She is aware that it is situational related to some work things, pandemic and often related to her menses.  She notes this has been building over the last 6-8 months.  She feels that she is just not handling things as well as usual, she is teary and has some very \"down\" days. Things are going well in her marriage and with her children.  She feels most comfortable in that environment.  She denies suicidal ideation.  Chapis is not in therapy right now, and is interested in connecting with someone.     Chapis feels that her cycles have been off since her COVID vaccine series.  She is comfortable with this, just more of an observation.      Menstrual History:  Menstrual History 11/29/2018 2/13/2020 9/27/2021   LAST MENSTRUAL PERIOD 11/25/2018 1/23/2020 9/16/2021       Last    Lab Results   Component Value Date    PAP NIL 11/29/2018     History of abnormal Pap smear: NO - age 30- 65 PAP every 3 years recommended    Last   Lab Results   Component Value Date    HPV16 Negative 11/29/2018     Last   Lab Results   Component Value Date    HPV18 Negative 11/29/2018     Last   Lab Results   Component Value Date    HRHPV Negative 11/29/2018       Mammogram current: not applicable  Last Mammogram:   US Breast Right Limited 1-3 Quadrants    Result Date: 12/6/2018  Narrative: Examinations: MA DIAGNOSTIC BILATERAL W/ SEAN, US BREAST RIGHT LIMITED 1-3 QUADRANTS, 12/6/2018 3:01 PM Comparisons: No comparison History/Family History: Palpable lump subareolar right breast. Right nipple inversion has been present for many years, at least 10. Father diagnosed with breast cancer. Calculated lifetime risk of breast cancer greater than 20%. Breast Density: Heterogeneously dense Technique: Standard mammographic views were performed with " tomosynthesis and 2D reconstruction. Findings:   Mammogram: At the site of the palpable area of concern in the right breast is a 5 mm calcification consistent with an oil cyst. No suspicious finding in either breast. Ultrasound: Targeted, real-time ultrasound evaluation was performed by the technologist and radiologist. In the subareolar right breast at the site of the palpable concern is a benign oil cyst. No suspicious finding.        Last Colonoscopy:  No results found for this or any previous visit.      HISTORY:  No current outpatient medications on file prior to visit.  No current facility-administered medications on file prior to visit.    No Known Allergies  Immunization History   Administered Date(s) Administered     COVID-19,PF,Zenaida 03/22/2021     Influenza Vaccine IM > 6 months Valent IIV4 (Alfuria,Fluzone) 10/10/2017, 11/29/2018, 10/27/2020, 09/27/2021     TDAP Vaccine (Boostrix) 02/13/2020       OB History   No obstetric history on file.     Past Medical History:   Diagnosis Date     NO ACTIVE PROBLEMS      Past Surgical History:   Procedure Laterality Date     HERNIA REPAIR       Family History   Problem Relation Age of Onset     Cancer Maternal Grandmother         Lung     Cancer Maternal Grandfather      Diabetes Maternal Grandfather      Heart Disease Paternal Grandfather      Anxiety Disorder Father      Depression Father      Substance Abuse Father      Hypertension No family hx of      Hyperlipidemia No family hx of      Breast Cancer No family hx of      Colon Cancer No family hx of      Prostate Cancer No family hx of      Social History     Socioeconomic History     Marital status:      Spouse name: None     Number of children: None     Years of education: None     Highest education level: None   Occupational History     None   Tobacco Use     Smoking status: Never Smoker     Smokeless tobacco: Never Used   Vaping Use     Vaping Use: Never used   Substance and Sexual Activity      "Alcohol use: Yes     Drug use: No     Sexual activity: Yes     Partners: Male   Other Topics Concern     Parent/sibling w/ CABG, MI or angioplasty before 65F 55M? Not Asked   Social History Narrative     None     Social Determinants of Health     Financial Resource Strain:      Difficulty of Paying Living Expenses:    Food Insecurity:      Worried About Running Out of Food in the Last Year:      Ran Out of Food in the Last Year:    Transportation Needs:      Lack of Transportation (Medical):      Lack of Transportation (Non-Medical):    Physical Activity:      Days of Exercise per Week:      Minutes of Exercise per Session:    Stress:      Feeling of Stress :    Social Connections:      Frequency of Communication with Friends and Family:      Frequency of Social Gatherings with Friends and Family:      Attends Shinto Services:      Active Member of Clubs or Organizations:      Attends Club or Organization Meetings:      Marital Status:    Intimate Partner Violence:      Fear of Current or Ex-Partner:      Emotionally Abused:      Physically Abused:      Sexually Abused:        ROS  Review Of Systems  Skin: negative  Eyes: negative  Ears/Nose/Throat: negative  Respiratory: No shortness of breath, dyspnea on exertion, cough, or hemoptysis  Cardiovascular: negative  Gastrointestinal: negative  Genitourinary: negative  Musculoskeletal: negative  Neurologic: negative  Psychiatric: negative and as above  Hematologic/Lymphatic/Immunologic: negative  Endocrine: negative    PHQ-9 SCORE 2/13/2020 9/27/2021   PHQ-9 Total Score 0 9     KACEY-7 SCORE 2/13/2020 9/27/2021   Total Score 0 12         EXAM:  Blood pressure 108/65, pulse 62, temperature 98.5  F (36.9  C), temperature source Oral, height 1.727 m (5' 8\"), weight 70.8 kg (156 lb 1.6 oz), last menstrual period 09/16/2021, SpO2 99 %, not currently breastfeeding. Body mass index is 23.73 kg/m .  General - pleasant female in no acute distress.  Skin - no suspicious lesions " or rashes  EENT-  PERRLA, euthyroid with out palpable nodules  Neck - supple without lymphadenopathy.  Lungs - clear to auscultation bilaterally.  Heart - regular rate and rhythm without murmur.  Abdomen - soft, nontender, nondistended, no masses or organomegaly noted.  Musculoskeletal - no gross deformities.  Neurological - normal strength, sensation, and mental status.    Breast Exam:  Breast: Without visible skin changes. No dimpling or lesions seen.   Breasts supple, non-tender with palpation, no dominant mass, nodularity, or nipple discharge noted bilaterally. Axillary nodes negative.          ASSESSMENT:  Encounter Diagnoses   Name Primary?     Routine history and physical examination of adult Yes     Depression, unspecified depression type      Perimenopause         PLAN:   Orders Placed This Encounter   Procedures     INFLUENZA VACCINE IM >6 MO VALENT IIV4 (AFLURIA/FLUZONE)     Comprehensive metabolic panel     Lipid panel reflex to direct LDL Fasting     TSH with free T4 reflex     Follicle stimulating hormone     Luteinizing Hormone Ped (7Y and Older)     Vitamin D Level     Vitamin B12     CBC with platelets and differential     1. Routine history and physical examination of adult    - CBC with platelets differential; Future  - Comprehensive metabolic panel; Future  - Lipid panel reflex to direct LDL Fasting; Future  - TSH with free T4 reflex; Future  - Vitamin D Level; Future  - Vitamin B12; Future    2. Depression, unspecified depression type    - Vitamin D Level; Future  - Vitamin B12; Future    Mental health referral.    3. Perimenopause    - Follicle stimulating hormone; Future  - Luteinizing Hormone Ped (7Y and Older); Future      Additional teaching done at this visit regarding exercise, birth control, mental health and weight/diet.    Nan and I will follow up with labs and other as they come in .    Return to clinic in one year.  Follow-up as needed.

## 2021-10-13 LAB — SCANNED LAB RESULT: NORMAL

## 2022-02-17 ENCOUNTER — TRANSFERRED RECORDS (OUTPATIENT)
Dept: HEALTH INFORMATION MANAGEMENT | Facility: CLINIC | Age: 42
End: 2022-02-17
Payer: COMMERCIAL

## 2022-06-10 ENCOUNTER — OFFICE VISIT (OUTPATIENT)
Dept: FAMILY MEDICINE | Facility: CLINIC | Age: 42
End: 2022-06-10
Payer: COMMERCIAL

## 2022-06-10 VITALS
DIASTOLIC BLOOD PRESSURE: 77 MMHG | SYSTOLIC BLOOD PRESSURE: 147 MMHG | TEMPERATURE: 98.5 F | HEART RATE: 62 BPM | OXYGEN SATURATION: 98 %

## 2022-06-10 DIAGNOSIS — F41.9 ANXIETY: Primary | ICD-10-CM

## 2022-06-10 DIAGNOSIS — F32.A DEPRESSION, UNSPECIFIED DEPRESSION TYPE: ICD-10-CM

## 2022-06-10 DIAGNOSIS — N63.20 LEFT BREAST LUMP: ICD-10-CM

## 2022-06-10 RX ORDER — ESCITALOPRAM OXALATE 20 MG/1
TABLET ORAL
Qty: 30 TABLET | Refills: 0 | Status: SHIPPED | OUTPATIENT
Start: 2022-06-10 | End: 2022-07-15

## 2022-06-10 ASSESSMENT — ANXIETY QUESTIONNAIRES
GAD7 TOTAL SCORE: 19
1. FEELING NERVOUS, ANXIOUS, OR ON EDGE: NEARLY EVERY DAY
5. BEING SO RESTLESS THAT IT IS HARD TO SIT STILL: NEARLY EVERY DAY
3. WORRYING TOO MUCH ABOUT DIFFERENT THINGS: NEARLY EVERY DAY
2. NOT BEING ABLE TO STOP OR CONTROL WORRYING: NEARLY EVERY DAY
IF YOU CHECKED OFF ANY PROBLEMS ON THIS QUESTIONNAIRE, HOW DIFFICULT HAVE THESE PROBLEMS MADE IT FOR YOU TO DO YOUR WORK, TAKE CARE OF THINGS AT HOME, OR GET ALONG WITH OTHER PEOPLE: SOMEWHAT DIFFICULT
6. BECOMING EASILY ANNOYED OR IRRITABLE: SEVERAL DAYS
7. FEELING AFRAID AS IF SOMETHING AWFUL MIGHT HAPPEN: NEARLY EVERY DAY
GAD7 TOTAL SCORE: 19

## 2022-06-10 ASSESSMENT — PATIENT HEALTH QUESTIONNAIRE - PHQ9
5. POOR APPETITE OR OVEREATING: NEARLY EVERY DAY
SUM OF ALL RESPONSES TO PHQ QUESTIONS 1-9: 10

## 2022-06-10 NOTE — NURSING NOTE
ROOM:2    Preferred Name: Chapis     41 year old  Chief Complaint   Patient presents with     Breast Mass     Depression     Anxiety       Blood pressure (!) 147/77, pulse 62, temperature 98.5  F (36.9  C), temperature source Oral, last menstrual period 05/20/2022, SpO2 98 %, not currently breastfeeding. There is no height or weight on file to calculate BMI.      Patient Active Problem List   Diagnosis     Seasonal allergic rhinitis       Wt Readings from Last 2 Encounters:   09/27/21 70.8 kg (156 lb 1.6 oz)   02/13/20 72.1 kg (159 lb)     BP Readings from Last 3 Encounters:   06/10/22 (!) 147/77   09/27/21 108/65   02/13/20 105/66       Allergies   Allergen Reactions     Seasonal Allergies        No current outpatient medications on file.     No current facility-administered medications for this visit.       Social History     Tobacco Use     Smoking status: Never Smoker     Smokeless tobacco: Never Used   Vaping Use     Vaping Use: Never used   Substance Use Topics     Alcohol use: Yes     Drug use: No       Honoring Choices - Health Care Directive Guide offered to patient at time of visit.    Health Maintenance Due   Topic Date Due     ADVANCE CARE PLANNING  Never done       Immunization History   Administered Date(s) Administered     COVID-19,PF,Zenaida 03/22/2021     Influenza Vaccine IM > 6 months Valent IIV4 (Alfuria,Fluzone) 10/10/2017, 11/29/2018, 10/27/2020, 09/27/2021     TDAP Vaccine (Boostrix) 02/13/2020       Lab Results   Component Value Date    PAP NIL 11/29/2018         Recent Labs   Lab Test 09/27/21  1356 02/13/20  1004 09/08/16  1245   LDL 76 80  --    HDL 56 57  --    TRIG 41 41  --    ALT 19  --   --    CR 0.85 0.94 0.62   GFRESTIMATED 85 76 >60   GFRESTBLACK  --  88 >60   ALBUMIN 4.2  --   --    POTASSIUM 4.0 4.9  --    TSH 1.02 1.15  --        PHQ-2 ( 1999 Pfizer) 2/13/2020 11/29/2018   Q1: Little interest or pleasure in doing things 0 0   Q2: Feeling down, depressed or hopeless 0 0   PHQ-2  Score 0 0   PHQ-2 Total Score (12-17 Years)- Positive if 3 or more points; Administer PHQ-A if positive 0 0       PHQ-9 SCORE 2/13/2020 9/27/2021 6/10/2022   PHQ-9 Total Score 0 9 10       KACEY-7 SCORE 2/13/2020 9/27/2021 6/10/2022   Total Score 0 12 19       No flowsheet data found.      Tami Thomas, [facility]  Jaja 10, 2022 11:19 AM

## 2022-06-10 NOTE — PROGRESS NOTES
Assessment & Plan   Chapis was seen today for breast mass, depression and anxiety.    Diagnoses and all orders for this visit:    Anxiety  -     escitalopram (LEXAPRO) 20 MG tablet; Take 1/2 tablet (10 mg) for 1-2 weeks, then increase to 1 tablet orally daily  Depression, unspecified depression type  -     escitalopram (LEXAPRO) 20 MG tablet; Take 1/2 tablet (10 mg) for 1-2 weeks, then increase to 1 tablet orally daily    Reviewed side effects. Discussed sleep hygiene. Utilizing unisom for short-term sleep relief while adjusting to medication. Med recheck in 1 month. Telephone or virtual okay.    Left breast lump  -     US Breast Left Limited 1-3 Quadrants; Future  -     Diagnostic Mammogram Digital Bilateral; Future  Palpated a non-distinct thickened area of tissue inferior to left nipple at 4-6 o o'clock. Likely fibrous/grandular breast tissue, but this area has been tender to palpation for several months. Discussed imaging is recommended for further eval.     RTC in 1 month for mental health recheck (virtual or telephone okay)       Val Waters, LEONARDA   ______________________________________    Subjective   Chapis is a 41 year old patient here today for Breast Mass, Depression, and Anxiety    Lump on left breast  -Present for a few months  -Does not hurt at rest, but tender if her kid elbows her or accidentally hits against it  -No nipple discharge or bleeding  -Always had right side, nipple discharge, cottage cheesy  -No family hx of breast cancer  -Lump in breast on right side, fatty necrosis    Depression/Anxiety  -Not sleeping well  -Usually has a lot in her toolkit, talking to people, exercise, and eating well  -Works a seasonal business, 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 No    Medical, surgical, family and social histories reviewed and updated as indicated.    Medications and allergies reviewed and updated as indicated.     ROS  Review Of Systems  See HPI      General Physical Exam:  Vitals: BP (!) 147/77   Pulse 62   Temp 98.5  F (36.9  C) (Oral)   LMP 05/20/2022 (Approximate)   SpO2 98%   Physical Exam  Vitals and nursing note reviewed.   Constitutional:       General: She is not in acute distress.     Appearance: Normal appearance. She is not ill-appearing.   HENT:      Head: Normocephalic and atraumatic.   Eyes:      General: Lids are normal.      Conjunctiva/sclera: Conjunctivae normal.   Pulmonary:      Effort: Pulmonary effort is normal.   Chest:   Breasts:      Right: Inverted nipple present. No swelling, bleeding, mass, skin change or tenderness.      Left: Tenderness (pea sized thickened tissue between 4-6 o clock, tender to deep palpation) present. No swelling, bleeding, inverted nipple, nipple discharge or skin change.       Skin:     Comments: Skin intact with no visible lesions.   Neurological:      General: No focal deficit present.      Mental Status: She is alert and oriented to person, place, and time.      Gait: Gait is intact.   Psychiatric:         Mood and Affect: Mood normal.         Behavior: Behavior normal.         Thought Content: Thought content normal.         Judgment: Judgment normal.

## 2022-06-15 ENCOUNTER — ANCILLARY PROCEDURE (OUTPATIENT)
Dept: MAMMOGRAPHY | Facility: CLINIC | Age: 42
End: 2022-06-15
Payer: COMMERCIAL

## 2022-06-15 DIAGNOSIS — N63.20 LEFT BREAST LUMP: ICD-10-CM

## 2022-06-15 PROCEDURE — 77066 DX MAMMO INCL CAD BI: CPT | Performed by: RADIOLOGY

## 2022-06-15 PROCEDURE — G0279 TOMOSYNTHESIS, MAMMO: HCPCS | Performed by: RADIOLOGY

## 2022-07-13 DIAGNOSIS — F32.A DEPRESSION, UNSPECIFIED DEPRESSION TYPE: ICD-10-CM

## 2022-07-13 DIAGNOSIS — F41.9 ANXIETY: ICD-10-CM

## 2022-07-14 RX ORDER — ESCITALOPRAM OXALATE 20 MG/1
TABLET ORAL
Qty: 30 TABLET | Refills: 0 | Status: CANCELLED | OUTPATIENT
Start: 2022-07-14

## 2022-07-14 NOTE — TELEPHONE ENCOUNTER
"  escitalopram (LEXAPRO) 20 MG tablet   Last Written Prescription Date:  6/10/22  Last Fill Quantity: 30,   # refills: 0  Last Office Visit :6/10/22  Future Office visit:  None    \" RTC in 1 month for mental health recheck (virtual or telephone okay)\"    Scheduling has been notified to contact the pt for appointment.      Routing refill request to provider for review/approval because: no appt scheduled  "

## 2022-07-14 NOTE — TELEPHONE ENCOUNTER
Called patient and se did not answer. I LVM asking her to give us a call back to get an appointment scheduled to follow up on the medication.   Charlotte DOMINGUEZ, EMT 9:58 AM 7/14/2022

## 2022-07-15 ENCOUNTER — VIRTUAL VISIT (OUTPATIENT)
Dept: FAMILY MEDICINE | Facility: CLINIC | Age: 42
End: 2022-07-15
Payer: COMMERCIAL

## 2022-07-15 DIAGNOSIS — F41.9 ANXIETY: Primary | ICD-10-CM

## 2022-07-15 DIAGNOSIS — F32.A DEPRESSION, UNSPECIFIED DEPRESSION TYPE: ICD-10-CM

## 2022-07-15 RX ORDER — ESCITALOPRAM OXALATE 20 MG/1
20 TABLET ORAL DAILY
Qty: 90 TABLET | Refills: 3 | Status: SHIPPED | OUTPATIENT
Start: 2022-07-15 | End: 2023-11-07

## 2022-07-15 NOTE — PROGRESS NOTES
"Chapis is a 42 year old who is being evaluated via a billable video visit.      How would you like to obtain your AVS? MyChart  If the video visit is dropped, the invitation should be resent by: Text to cell phone: [phone]  Will anyone else be joining your video visit? No    Assessment & Plan   Problem List Items Addressed This Visit    None     Visit Diagnoses     Anxiety    -  Primary    Relevant Medications    escitalopram (LEXAPRO) 20 MG tablet    Depression, unspecified depression type        Relevant Medications    escitalopram (LEXAPRO) 20 MG tablet       Tolerating medication well. Initially had side effects, but those have passed. No SI/HI. Noticed a positive change in her mood. Reasonable to provide a year of refills and pt will f/u as needed. Discussed the importance of not discontinuing abruptly.      RTC PRN    Val Waters, NP  [facility] [employer] [employer]    Subjective   Chapis is a 42 year old presenting for the following health issues:  Refill Request (escitalopram (LEXAPRO) 20 MG tablet)    HPI     Med refill  Tolerating the lexapro well  Started out at 10 mg, then 2 weeks ago increased to 20mg  Initially had some side effects, felt a little off. Those side effects have passed  Now just feels a significant increase in her mood  No SI/HI  No headaches, nausea, GI upset, or sleep disturbances  Feels less \"spirally\"     Review of Systems   See HPI      Objective       Vitals:  No vitals were obtained today due to virtual visit.    Physical Exam   GENERAL: Healthy, alert and no distress  EYES: Eyes grossly normal to inspection.  No discharge or erythema, or obvious scleral/conjunctival abnormalities.  RESP: No audible wheeze, cough, or visible cyanosis.  No visible retractions or increased work of breathing.    SKIN: Visible skin clear. No significant rash, abnormal pigmentation or lesions.  NEURO: Cranial nerves grossly intact.  Mentation and speech appropriate for age.  PSYCH: Mentation appears normal, " affect normal/bright, judgement and insight intact, normal speech and appearance well-groomed.          Video-Visit Details    Video Start Time: 1:45pm    Type of service:  Video Visit    Video End Time:1:50pm    Originating Location (pt. Location): Other work    Distant Location (provider location):  [facility]     Platform used for Video Visit: Gil[employer].

## 2023-01-14 ENCOUNTER — HEALTH MAINTENANCE LETTER (OUTPATIENT)
Age: 43
End: 2023-01-14

## 2023-11-05 DIAGNOSIS — F41.9 ANXIETY: ICD-10-CM

## 2023-11-05 DIAGNOSIS — F32.A DEPRESSION, UNSPECIFIED DEPRESSION TYPE: ICD-10-CM

## 2023-11-08 RX ORDER — ESCITALOPRAM OXALATE 20 MG/1
20 TABLET ORAL DAILY
Qty: 90 TABLET | Refills: 0 | Status: SHIPPED | OUTPATIENT
Start: 2023-11-08 | End: 2024-02-05

## 2023-11-08 NOTE — TELEPHONE ENCOUNTER
escitalopram (LEXAPRO) 20 MG tablet 90 tablet 3 7/15/2022     Last Office Visit: 7/15/22  Future Office visit:  none     PHQ-9 6/10/22--10  GAP IN THERAPY    90 day shannan refill sent to the pharmacy with instructions to schedule follow up.  . Overdue appointment and PHQ-9 per the refill protocol     Aileen Muniz RN

## 2024-02-05 ENCOUNTER — OFFICE VISIT (OUTPATIENT)
Dept: FAMILY MEDICINE | Facility: CLINIC | Age: 44
End: 2024-02-05
Payer: COMMERCIAL

## 2024-02-05 VITALS
SYSTOLIC BLOOD PRESSURE: 123 MMHG | RESPIRATION RATE: 18 BRPM | TEMPERATURE: 98.1 F | OXYGEN SATURATION: 99 % | BODY MASS INDEX: 24.98 KG/M2 | WEIGHT: 164.8 LBS | HEART RATE: 78 BPM | DIASTOLIC BLOOD PRESSURE: 70 MMHG | HEIGHT: 68 IN

## 2024-02-05 DIAGNOSIS — R05.1 ACUTE COUGH: Primary | ICD-10-CM

## 2024-02-05 DIAGNOSIS — F41.9 ANXIETY: ICD-10-CM

## 2024-02-05 DIAGNOSIS — F32.A DEPRESSION, UNSPECIFIED DEPRESSION TYPE: ICD-10-CM

## 2024-02-05 RX ORDER — ESCITALOPRAM OXALATE 20 MG/1
20 TABLET ORAL DAILY
Qty: 90 TABLET | Refills: 3 | Status: SHIPPED | OUTPATIENT
Start: 2024-02-05 | End: 2024-10-03

## 2024-02-05 RX ORDER — ALBUTEROL SULFATE 90 UG/1
2 AEROSOL, METERED RESPIRATORY (INHALATION) EVERY 6 HOURS PRN
Qty: 18 G | Refills: 1 | Status: SHIPPED | OUTPATIENT
Start: 2024-02-05 | End: 2024-02-05

## 2024-02-05 RX ORDER — ALBUTEROL SULFATE 90 UG/1
2 AEROSOL, METERED RESPIRATORY (INHALATION) EVERY 6 HOURS PRN
Qty: 18 G | Refills: 1 | Status: SHIPPED | OUTPATIENT
Start: 2024-02-05

## 2024-02-05 ASSESSMENT — ANXIETY QUESTIONNAIRES
2. NOT BEING ABLE TO STOP OR CONTROL WORRYING: SEVERAL DAYS
5. BEING SO RESTLESS THAT IT IS HARD TO SIT STILL: NOT AT ALL
6. BECOMING EASILY ANNOYED OR IRRITABLE: NOT AT ALL
GAD7 TOTAL SCORE: 4
4. TROUBLE RELAXING: SEVERAL DAYS
3. WORRYING TOO MUCH ABOUT DIFFERENT THINGS: SEVERAL DAYS
GAD7 TOTAL SCORE: 4
8. IF YOU CHECKED OFF ANY PROBLEMS, HOW DIFFICULT HAVE THESE MADE IT FOR YOU TO DO YOUR WORK, TAKE CARE OF THINGS AT HOME, OR GET ALONG WITH OTHER PEOPLE?: NOT DIFFICULT AT ALL
7. FEELING AFRAID AS IF SOMETHING AWFUL MIGHT HAPPEN: NOT AT ALL
GAD7 TOTAL SCORE: 4
1. FEELING NERVOUS, ANXIOUS, OR ON EDGE: SEVERAL DAYS
IF YOU CHECKED OFF ANY PROBLEMS ON THIS QUESTIONNAIRE, HOW DIFFICULT HAVE THESE PROBLEMS MADE IT FOR YOU TO DO YOUR WORK, TAKE CARE OF THINGS AT HOME, OR GET ALONG WITH OTHER PEOPLE: NOT DIFFICULT AT ALL
7. FEELING AFRAID AS IF SOMETHING AWFUL MIGHT HAPPEN: NOT AT ALL

## 2024-02-05 ASSESSMENT — PAIN SCALES - GENERAL: PAINLEVEL: NO PAIN (0)

## 2024-02-05 ASSESSMENT — PATIENT HEALTH QUESTIONNAIRE - PHQ9
10. IF YOU CHECKED OFF ANY PROBLEMS, HOW DIFFICULT HAVE THESE PROBLEMS MADE IT FOR YOU TO DO YOUR WORK, TAKE CARE OF THINGS AT HOME, OR GET ALONG WITH OTHER PEOPLE: NOT DIFFICULT AT ALL
SUM OF ALL RESPONSES TO PHQ QUESTIONS 1-9: 4
SUM OF ALL RESPONSES TO PHQ QUESTIONS 1-9: 4

## 2024-02-05 NOTE — PROGRESS NOTES
"Chapis Zepeda is a 43 year old female who presents today for a follow up and refill on her escitalopram 20 mg daily.  She is generally taking 20 mg/day, she may take 10 mg/day if she feels that she is very stable.   She splits the pill in half and feels that this combination and flexibility to use just a little less once in awhile is great for her.  She would like to continue at 20 mg daily and be flexible to take 10 mg/day occasionally  (as above.)    Answers for HPI/ROS submitted by the patient on 2/5/2024  If you checked off any problems, how difficult have these problems made it for you to do your work, take care of things at home, or get along with other people?: Not difficult at all  PHQ9 TOTAL SCORE: 4  KACEY 7 TOTAL SCORE: 4    2.)  Chapis has had a 2 week history of a sore throat, cough, limited appetite, fatigue, and a \"swimmy brain\", or brain fog symptoms.  She had COVID the week before 12/25/23, she has taken several COVID tests within the past 2 weeks, all negative.  This started with an episode of severe chilling, unknown if fever or not, followed by fatigue.  This has gotten a bit better than worse again.  She originally called the clinic for this appointment 3 days ago and may feel just a bit  Better today.  She continues to have a very sore throat.   She does not get strep often, she does not have any known exposure, no one around her is ill.  The cough is frequent, happens when she is doing some type of physical activity and at night.  She has even resorted to sleeping with a cough lozenge in her mouth at night.  She does not bring up a lot of mucus.     Chapis does not have chest pain, but describes a tightness and a temporary feeling of shortness of breath with activity.      Review Of Systems   ROS: 10 point ROS neg other than the symptoms noted above in the HPI.      Past Medical History:   Diagnosis Date     NO ACTIVE PROBLEMS      Past Surgical History:   Procedure Laterality Date     HERNIA REPAIR   "     Social History     Socioeconomic History     Marital status:      Spouse name: Not on file     Number of children: Not on file     Years of education: Not on file     Highest education level: Not on file   Occupational History     Not on file   Tobacco Use     Smoking status: Never     Smokeless tobacco: Never   Vaping Use     Vaping Use: Never used   Substance and Sexual Activity     Alcohol use: Yes     Drug use: No     Sexual activity: Yes     Partners: Male   Other Topics Concern     Parent/sibling w/ CABG, MI or angioplasty before 65F 55M? Not Asked   Social History Narrative     Not on file     Social Determinants of Health     Financial Resource Strain: Low Risk  (2/5/2024)    Financial Resource Strain      Within the past 12 months, have you or your family members you live with been unable to get utilities (heat, electricity) when it was really needed?: No   Food Insecurity: Low Risk  (2/5/2024)    Food Insecurity      Within the past 12 months, did you worry that your food would run out before you got money to buy more?: No      Within the past 12 months, did the food you bought just not last and you didn t have money to get more?: No   Transportation Needs: Low Risk  (2/5/2024)    Transportation Needs      Within the past 12 months, has lack of transportation kept you from medical appointments, getting your medicines, non-medical meetings or appointments, work, or from getting things that you need?: No   Physical Activity: Not on file   Stress: Not on file   Social Connections: Not on file   Interpersonal Safety: Not on file   Housing Stability: Low Risk  (2/5/2024)    Housing Stability      Do you have housing? : Yes      Are you worried about losing your housing?: No     Family History   Problem Relation Age of Onset     Cancer Maternal Grandmother         Lung     Cancer Maternal Grandfather      Diabetes Maternal Grandfather      Heart Disease Paternal Grandfather      Anxiety Disorder  "Father      Depression Father      Substance Abuse Father      Hypertension No family hx of      Hyperlipidemia No family hx of      Breast Cancer No family hx of      Colon Cancer No family hx of      Prostate Cancer No family hx of        /70 (BP Location: Left arm, Patient Position: Sitting, Cuff Size: Adult Regular)   Pulse 78   Temp 98.1  F (36.7  C) (Oral)   Resp 18   Ht 1.735 m (5' 8.3\")   Wt 74.8 kg (164 lb 12.8 oz)   SpO2 99%   BMI 24.84 kg/m      Exam:  Constitutional: alert and mild distress  Head: Normocephalic. No masses, lesions, tenderness or abnormalities  Neck: Neck supple. No adenopathy. Thyroid symmetric, normal size,, Carotids without bruits.  ENT: ENT exam normal, no neck nodes or sinus tenderness, slight hoarseness to voice  Cardiovascular: negative, PMI normal. No lifts, heaves, or thrills. RRR. No murmurs, clicks gallops or rub  Respiratory: negative, Percussion normal. Good diaphragmatic excursion. Lungs clear  Psychiatric: mentation appears normal and affect normal/bright  Hematologic/Lymphatic/Immunologic: Normal cervical lymph nodes    Assessment/Plan:  1. Acute cough    - albuterol (PROAIR HFA/PROVENTIL HFA/VENTOLIN HFA) 108 (90 Base) MCG/ACT inhaler; Inhale 2 puffs into the lungs every 6 hours as needed for shortness of breath, wheezing or cough  Dispense: 18 g; Refill: 1    Nan will try this before exercise/activity, she will let me know how she feels in a day or 2.  Because she is feeling better, we will treat conservatively for the moment, thinking it is most likely a resolving viral infection.     2. Anxiety    - escitalopram (LEXAPRO) 20 MG tablet; Take 1 tablet (20 mg) by mouth daily  Dispense: 90 tablet; Refill: 3    3. Depression, unspecified depression type    - escitalopram (LEXAPRO) 20 MG tablet; Take 1 tablet (20 mg) by mouth daily  Dispense: 90 tablet; Refill: 3      Options for treatment and follow-up care were reviewed with the patient. Patient engaged in the " decision making process and verbalized understanding of the options discussed and agreed with the final plan.

## 2024-02-05 NOTE — NURSING NOTE
"ROOM:1  PORFIRIO HAWK    Preferred Name: Chapis     How did you hear about us?  Current Patient     Services Provided? No    43 year old  Chief Complaint   Patient presents with     Refill Request     Lexapro check in     Cough     Cough, sore throat, no appetite, fatigue, \"swimmy brain\"   Two weeks, has taken many covid tests and they were negative       Blood pressure 123/70, pulse 78, temperature 98.1  F (36.7  C), temperature source Oral, resp. rate 18, height 1.735 m (5' 8.3\"), weight 74.8 kg (164 lb 12.8 oz), SpO2 99%, not currently breastfeeding. Body mass index is 24.84 kg/m .  BP completed using cuff size:        Patient Active Problem List   Diagnosis     Seasonal allergic rhinitis       Wt Readings from Last 2 Encounters:   02/05/24 74.8 kg (164 lb 12.8 oz)   09/27/21 70.8 kg (156 lb 1.6 oz)     BP Readings from Last 3 Encounters:   02/05/24 123/70   06/10/22 (!) 147/77   09/27/21 108/65       Allergies   Allergen Reactions     Seasonal Allergies        Current Outpatient Medications   Medication     escitalopram (LEXAPRO) 20 MG tablet     No current facility-administered medications for this visit.       Social History     Tobacco Use     Smoking status: Never     Smokeless tobacco: Never   Vaping Use     Vaping Use: Never used   Substance Use Topics     Alcohol use: Yes     Drug use: No       Honoring Choices - Health Care Directive Guide offered to patient at time of visit.    Health Maintenance Due   Topic Date Due     ADVANCE CARE PLANNING  Never done     HEPATITIS B IMMUNIZATION (1 of 3 - 3-dose series) Never done     YEARLY PREVENTIVE VISIT  09/27/2022     HPV TEST  11/29/2023     PAP  11/29/2023       Immunization History   Administered Date(s) Administered     COVID-19 Bivalent 12+ (Pfizer) 12/14/2022     COVID-19 MONOVALENT 12+ (Pfizer) 11/10/2021     COVID-19 Vaccine (Zenaida) 03/22/2021     Influenza Vaccine >6 months,quad, PF 10/10/2017, 11/29/2018, 10/27/2020, 09/27/2021     TDAP " Vaccine (Boostrix) 02/13/2020       Lab Results   Component Value Date    PAP NIL 11/29/2018       Recent Labs   Lab Test 09/27/21  1356 02/13/20  1004 09/08/16  1245   LDL 76 80  --    HDL 56 57  --    TRIG 41 41  --    ALT 19  --   --    CR 0.85 0.94 0.62   GFRESTIMATED 85 76 >60   GFRESTBLACK  --  88 >60   ALBUMIN 4.2  --   --    POTASSIUM 4.0 4.9  --    TSH 1.02 1.15  --            2/13/2020     9:08 AM 11/29/2018     2:04 PM   PHQ-2 ( 1999 Pfizer)   Q1: Little interest or pleasure in doing things 0 0   Q2: Feeling down, depressed or hopeless 0 0   PHQ-2 Score 0 0   PHQ-2 Total Score (12-17 Years)- Positive if 3 or more points; Administer PHQ-A if positive 0 0           2/13/2020     9:08 AM 9/27/2021    12:46 PM 6/10/2022    11:13 AM 2/5/2024     9:07 AM   PHQ-9 SCORE   PHQ-9 Total Score MyChart    4 (Minimal depression)   PHQ-9 Total Score 0 9 10 4           9/27/2021    12:46 PM 6/10/2022    11:13 AM 2/5/2024     9:08 AM   KACEY-7 SCORE   Total Score   4 (minimal anxiety)   Total Score 12 19 4            No data to display                Charlotte Horta, EMT    February 5, 2024 9:13 AM

## 2024-02-11 ENCOUNTER — HEALTH MAINTENANCE LETTER (OUTPATIENT)
Age: 44
End: 2024-02-11

## 2024-09-08 ENCOUNTER — HEALTH MAINTENANCE LETTER (OUTPATIENT)
Age: 44
End: 2024-09-08

## 2024-10-03 ENCOUNTER — OFFICE VISIT (OUTPATIENT)
Dept: FAMILY MEDICINE | Facility: CLINIC | Age: 44
End: 2024-10-03
Payer: COMMERCIAL

## 2024-10-03 VITALS
OXYGEN SATURATION: 97 % | HEIGHT: 68 IN | WEIGHT: 166.5 LBS | RESPIRATION RATE: 16 BRPM | HEART RATE: 57 BPM | TEMPERATURE: 97.7 F | SYSTOLIC BLOOD PRESSURE: 105 MMHG | BODY MASS INDEX: 25.23 KG/M2 | DIASTOLIC BLOOD PRESSURE: 72 MMHG

## 2024-10-03 DIAGNOSIS — Z71.84 TRAVEL ADVICE ENCOUNTER: ICD-10-CM

## 2024-10-03 DIAGNOSIS — L81.9 ATYPICAL PIGMENTED SKIN LESION: ICD-10-CM

## 2024-10-03 DIAGNOSIS — F41.9 ANXIETY: ICD-10-CM

## 2024-10-03 DIAGNOSIS — Z00.00 ROUTINE HISTORY AND PHYSICAL EXAMINATION OF ADULT: Primary | ICD-10-CM

## 2024-10-03 DIAGNOSIS — F32.A DEPRESSION, UNSPECIFIED DEPRESSION TYPE: ICD-10-CM

## 2024-10-03 LAB
BASOPHILS # BLD AUTO: 0 10E3/UL (ref 0–0.2)
BASOPHILS NFR BLD AUTO: 1 %
EOSINOPHIL # BLD AUTO: 0.1 10E3/UL (ref 0–0.7)
EOSINOPHIL NFR BLD AUTO: 1 %
ERYTHROCYTE [DISTWIDTH] IN BLOOD BY AUTOMATED COUNT: 11.7 % (ref 10–15)
EST. AVERAGE GLUCOSE BLD GHB EST-MCNC: 91 MG/DL
HBA1C MFR BLD: 4.8 %
HCT VFR BLD AUTO: 40 % (ref 35–47)
HGB BLD-MCNC: 13.7 G/DL (ref 11.7–15.7)
IMM GRANULOCYTES # BLD: 0 10E3/UL
IMM GRANULOCYTES NFR BLD: 0 %
LYMPHOCYTES # BLD AUTO: 0.9 10E3/UL (ref 0.8–5.3)
LYMPHOCYTES NFR BLD AUTO: 16 %
MCH RBC QN AUTO: 33.3 PG (ref 26.5–33)
MCHC RBC AUTO-ENTMCNC: 34.3 G/DL (ref 31.5–36.5)
MCV RBC AUTO: 97 FL (ref 78–100)
MONOCYTES # BLD AUTO: 0.5 10E3/UL (ref 0–1.3)
MONOCYTES NFR BLD AUTO: 9 %
NEUTROPHILS # BLD AUTO: 4.1 10E3/UL (ref 1.6–8.3)
NEUTROPHILS NFR BLD AUTO: 73 %
NRBC # BLD AUTO: 0 10E3/UL
NRBC BLD AUTO-RTO: 0 /100
PLATELET # BLD AUTO: 212 10E3/UL (ref 150–450)
RBC # BLD AUTO: 4.12 10E6/UL (ref 3.8–5.2)
WBC # BLD AUTO: 5.6 10E3/UL (ref 4–11)

## 2024-10-03 PROCEDURE — 80061 LIPID PANEL: CPT | Mod: ORL | Performed by: NURSE PRACTITIONER

## 2024-10-03 PROCEDURE — 83036 HEMOGLOBIN GLYCOSYLATED A1C: CPT | Mod: ORL | Performed by: NURSE PRACTITIONER

## 2024-10-03 PROCEDURE — 84443 ASSAY THYROID STIM HORMONE: CPT | Mod: ORL | Performed by: NURSE PRACTITIONER

## 2024-10-03 PROCEDURE — 82040 ASSAY OF SERUM ALBUMIN: CPT | Mod: ORL | Performed by: NURSE PRACTITIONER

## 2024-10-03 PROCEDURE — 87624 HPV HI-RISK TYP POOLED RSLT: CPT | Mod: ORL | Performed by: NURSE PRACTITIONER

## 2024-10-03 PROCEDURE — G0145 SCR C/V CYTO,THINLAYER,RESCR: HCPCS | Mod: ORL | Performed by: NURSE PRACTITIONER

## 2024-10-03 PROCEDURE — 85025 COMPLETE CBC W/AUTO DIFF WBC: CPT | Mod: ORL | Performed by: NURSE PRACTITIONER

## 2024-10-03 RX ORDER — ESCITALOPRAM OXALATE 20 MG/1
20 TABLET ORAL DAILY
Qty: 90 TABLET | Refills: 3 | Status: SHIPPED | OUTPATIENT
Start: 2024-10-03

## 2024-10-03 SDOH — HEALTH STABILITY: PHYSICAL HEALTH: ON AVERAGE, HOW MANY DAYS PER WEEK DO YOU ENGAGE IN MODERATE TO STRENUOUS EXERCISE (LIKE A BRISK WALK)?: 7 DAYS

## 2024-10-03 ASSESSMENT — PAIN SCALES - GENERAL: PAINLEVEL: NO PAIN (0)

## 2024-10-03 ASSESSMENT — SOCIAL DETERMINANTS OF HEALTH (SDOH): HOW OFTEN DO YOU GET TOGETHER WITH FRIENDS OR RELATIVES?: MORE THAN THREE TIMES A WEEK

## 2024-10-03 NOTE — NURSING NOTE
"ROOM:3  PORFIRIO HAWK    Preferred Name: Chapis     Social Determinants of Health   SDoH screening reviewed today: Yes     Services Provided? No    44 year old  Chief Complaint   Patient presents with    Physical     No concerns, hep vaccines       Blood pressure 105/72, pulse 57, temperature 97.7  F (36.5  C), temperature source Oral, resp. rate 16, height 1.732 m (5' 8.2\"), weight 75.5 kg (166 lb 8 oz), last menstrual period 09/25/2024, SpO2 97%, not currently breastfeeding. Body mass index is 25.17 kg/m .  BP completed using cuff size:        Patient Active Problem List   Diagnosis    Seasonal allergic rhinitis       Wt Readings from Last 2 Encounters:   10/03/24 75.5 kg (166 lb 8 oz)   02/05/24 74.8 kg (164 lb 12.8 oz)     BP Readings from Last 3 Encounters:   10/03/24 105/72   02/05/24 123/70   06/10/22 (!) 147/77       Allergies   Allergen Reactions    Seasonal Allergies        Current Outpatient Medications   Medication Sig Dispense Refill    albuterol (PROAIR HFA/PROVENTIL HFA/VENTOLIN HFA) 108 (90 Base) MCG/ACT inhaler Inhale 2 puffs into the lungs every 6 hours as needed for shortness of breath, wheezing or cough 18 g 1    escitalopram (LEXAPRO) 20 MG tablet Take 1 tablet (20 mg) by mouth daily 90 tablet 3     No current facility-administered medications for this visit.       Social History     Tobacco Use    Smoking status: Never    Smokeless tobacco: Never   Vaping Use    Vaping status: Never Used   Substance Use Topics    Alcohol use: Yes    Drug use: No       Honoring Choices - Health Care Directive Guide offered to patient at time of visit.    Health Maintenance Due   Topic Date Due    ADVANCE CARE PLANNING  Never done    HEPATITIS B IMMUNIZATION (1 of 3 - 19+ 3-dose series) Never done    YEARLY PREVENTIVE VISIT  09/27/2022    HPV TEST  11/29/2023    PAP  11/29/2023    MAMMO SCREENING  06/15/2024    INFLUENZA VACCINE (1) 09/01/2024    COVID-19 Vaccine (5 - 2024-25 season) 09/01/2024    " GLUCOSE  09/27/2024       Immunization History   Administered Date(s) Administered    COVID-19 Bivalent 12+ (Pfizer) 12/14/2022    COVID-19 MONOVALENT 12+ (Pfizer) 11/10/2021    COVID-19 Vaccine (Zenaida) 03/22/2021    Influenza Vaccine >6 months,quad, PF 10/10/2017, 11/29/2018, 10/27/2020, 09/27/2021    TDAP Vaccine (Boostrix) 02/13/2020       Lab Results   Component Value Date    PAP NIL 11/29/2018       Recent Labs   Lab Test 09/27/21  1356 02/13/20  1004 09/08/16  1245   LDL 76 80  --    HDL 56 57  --    TRIG 41 41  --    ALT 19  --   --    CR 0.85 0.94 0.62   GFRESTIMATED 85 76 >60   GFRESTBLACK  --  88 >60   ALBUMIN 4.2  --   --    POTASSIUM 4.0 4.9  --    TSH 1.02 1.15  --            10/3/2024     8:54 AM 2/13/2020     9:08 AM   PHQ-2 ( 1999 Pfizer)   Q1: Little interest or pleasure in doing things 0 0   Q2: Feeling down, depressed or hopeless 0 0   PHQ-2 Score 0 0   PHQ-2 Total Score (12-17 Years)- Positive if 3 or more points; Administer PHQ-A if positive  0   Q1: Little interest or pleasure in doing things Not at all    Q2: Feeling down, depressed or hopeless Not at all    PHQ-2 Score 0            2/13/2020     9:08 AM 9/27/2021    12:46 PM 6/10/2022    11:13 AM 2/5/2024     9:07 AM   PHQ-9 SCORE   PHQ-9 Total Score MyChart    4 (Minimal depression)   PHQ-9 Total Score 0 9 10 4           9/27/2021    12:46 PM 6/10/2022    11:13 AM 2/5/2024     9:08 AM   KACEY-7 SCORE   Total Score   4 (minimal anxiety)   Total Score 12 19 4            No data to display                Charlotte Horta, EMT    October 3, 2024 9:02 AM

## 2024-10-04 LAB
ALBUMIN SERPL BCG-MCNC: 4.8 G/DL (ref 3.5–5.2)
ALP SERPL-CCNC: 39 U/L (ref 40–150)
ALT SERPL W P-5'-P-CCNC: 17 U/L (ref 0–50)
ANION GAP SERPL CALCULATED.3IONS-SCNC: 10 MMOL/L (ref 7–15)
AST SERPL W P-5'-P-CCNC: 21 U/L (ref 0–45)
BILIRUB SERPL-MCNC: 0.7 MG/DL
BUN SERPL-MCNC: 15.3 MG/DL (ref 6–20)
CALCIUM SERPL-MCNC: 9.2 MG/DL (ref 8.8–10.4)
CHLORIDE SERPL-SCNC: 103 MMOL/L (ref 98–107)
CHOLEST SERPL-MCNC: 159 MG/DL
CREAT SERPL-MCNC: 0.91 MG/DL (ref 0.51–0.95)
EGFRCR SERPLBLD CKD-EPI 2021: 79 ML/MIN/1.73M2
FASTING STATUS PATIENT QL REPORTED: NORMAL
GLUCOSE SERPL-MCNC: 92 MG/DL (ref 70–99)
HCO3 SERPL-SCNC: 26 MMOL/L (ref 22–29)
HDLC SERPL-MCNC: 60 MG/DL
HPV HR 12 DNA CVX QL NAA+PROBE: NEGATIVE
HPV16 DNA CVX QL NAA+PROBE: NEGATIVE
HPV18 DNA CVX QL NAA+PROBE: NEGATIVE
HUMAN PAPILLOMA VIRUS FINAL DIAGNOSIS: NORMAL
LDLC SERPL CALC-MCNC: 93 MG/DL
NONHDLC SERPL-MCNC: 99 MG/DL
POTASSIUM SERPL-SCNC: 4.2 MMOL/L (ref 3.4–5.3)
PROT SERPL-MCNC: 7 G/DL (ref 6.4–8.3)
SODIUM SERPL-SCNC: 139 MMOL/L (ref 135–145)
TRIGL SERPL-MCNC: 31 MG/DL
TSH SERPL DL<=0.005 MIU/L-ACNC: 1.55 UIU/ML (ref 0.3–4.2)

## 2024-10-09 LAB
BKR AP ASSOCIATED HPV REPORT: NORMAL
BKR LAB AP GYN ADEQUACY: NORMAL
BKR LAB AP GYN INTERPRETATION: NORMAL
BKR LAB AP LMP: NORMAL
BKR LAB AP PREVIOUS ABNORMAL: NORMAL
PATH REPORT.COMMENTS IMP SPEC: NORMAL
PATH REPORT.COMMENTS IMP SPEC: NORMAL
PATH REPORT.RELEVANT HX SPEC: NORMAL

## 2024-10-15 ENCOUNTER — OFFICE VISIT (OUTPATIENT)
Dept: DERMATOLOGY | Facility: CLINIC | Age: 44
End: 2024-10-15
Attending: NURSE PRACTITIONER
Payer: COMMERCIAL

## 2024-10-15 DIAGNOSIS — D22.9 MULTIPLE NEVI: ICD-10-CM

## 2024-10-15 DIAGNOSIS — L81.4 LENTIGINES: ICD-10-CM

## 2024-10-15 DIAGNOSIS — L82.1 SEBORRHEIC KERATOSES: ICD-10-CM

## 2024-10-15 DIAGNOSIS — Z12.83 ENCOUNTER FOR SCREENING FOR MALIGNANT NEOPLASM OF SKIN: Primary | ICD-10-CM

## 2024-10-15 DIAGNOSIS — D23.9 LINEAR EPIDERMAL NEVUS: ICD-10-CM

## 2024-10-15 DIAGNOSIS — D18.01 CHERRY ANGIOMA: ICD-10-CM

## 2024-10-15 PROCEDURE — 99203 OFFICE O/P NEW LOW 30 MIN: CPT | Performed by: PHYSICIAN ASSISTANT

## 2024-10-15 ASSESSMENT — PAIN SCALES - GENERAL: PAINLEVEL: NO PAIN (0)

## 2024-10-15 NOTE — PROGRESS NOTES
[facility] Dermatology Note  Encounter Date: Oct 15, 2024  Office Visit     Reviewed patients past medical history and pertinent chart review prior to patients visit today.     Dermatology Problem List:  # FBSC 10/15/2024   # Linear epidermal nevus, left leg  - derm surgery referral 10/15/2024     No personal history of skin cancer.  No family history of skin cancer.    Social history: Owns a [employer] company   ____________________________________________    Assessment & Plan:     # Linear epidermal nevus, left leg  - Involvement of the left inguinal crease, left labia majora, and left buttock is bothersome as it catches against clothing. We discussed the option of referral to dermatology surgery to consider excision. We reviewed risks including scarring, infection, and pain. Due to the size of the lesions a stepwise approach may be utilized. The patient expressed interest in referral and this order was placed.     # Multiple nevi, trunk and extremities  # Solar lentigines  - No concerning features on dermoscopy. We discussed the importance of self exams at home. ABCDE criteria and importance of photoprotection reviewed.     # Cherry angiomas  # Seborrheic keratoses  - We discussed the benign nature of the skin lesions. No treatment required. Continued observation recommended. Follow up with any concerns.      Follow-up:  Annual for follow up full body skin exam, as needed for new or changing lesions or new concerns    All risks, benefits and alternatives were discussed with patient.  Patient is in agreement and understands the assessment and plan.  All questions were answered.  Char Pelayo PA-C  [facility]    ____________________________________________    CC: Derm Problem (FBSE- Birth areli that goes up entire leg)    HPI:  Ms. Chapis Zepeda is a(n) 44 year old female who presents today as a new patient for a full body skin cancer screening. The patient requests  evaluation of a lesion on the left leg. The lesion has been present since birth. The area involving her left groin and buttock has becomes thickened over time. It catches against clothing and is bothersome. No other specific cutaneous concerns today. The patient reports trying to be diligent with photoprotection.      Physical Exam:  Vitals: LMP 09/25/2024 (Exact Date)   SKIN: Total skin excluding the genitalia areas was performed. The exam included the scalp, face, neck, bilateral arms, chest, back, abdomen, bilateral legs, digits, mons pubis, buttocks, and nails.   - Jorgensen II-III.  - The left medial calf and thigh demonstrates tan to brown macules, arranged in a linear fashion. The left inguinal crease, labia majora, and buttock demonstrates verrucous brown patches.   - Multiple tan/brown macules and papules scattered throughout exam, consistent with benign nevi. No concerning features on dermoscopy.   - Scattered tan, homogenous macules scattered on sun exposed skin, consistent with solar lentigines.   - Scattered waxy, stuck on appearing papules and patches, consistent with seborrheic keratoses.    - Several 1-2 mm red dome shaped symmetric papules, consistent with cherry angiomas.     Medications:  Current Outpatient Medications   Medication Sig Dispense Refill    escitalopram (LEXAPRO) 20 MG tablet Take 1 tablet (20 mg) by mouth daily. 90 tablet 3    albuterol (PROAIR HFA/PROVENTIL HFA/VENTOLIN HFA) 108 (90 Base) MCG/ACT inhaler Inhale 2 puffs into the lungs every 6 hours as needed for shortness of breath, wheezing or cough (Patient not taking: Reported on 10/15/2024) 18 g 1     No current facility-administered medications for this visit.      Past Medical History:   Patient Active Problem List   Diagnosis    Seasonal allergic rhinitis     Past Medical History:   Diagnosis Date    NO ACTIVE PROBLEMS        ROBERT Sullivan NP  [address] [place] [phone] on close of this encounter.

## 2024-10-15 NOTE — PATIENT INSTRUCTIONS
Patient Education        Proper skin care from [facility]:     -Eliminate harsh soaps as they strip the natural oils from the skin, often resulting in dry itchy skin ( i.e. Dial, Zest, Pashto Spring)  -Use mild soaps such as Cetaphil or Dove Sensitive Skin in the shower. You do not need to use soap on arms, legs, and trunk every time you shower unless visibly soiled.   -Avoid hot or cold showers.  -After showering, lightly dry off and apply moisturizing within 2-3 minutes. This will help trap moisture in the skin.   -Aggressive use of a moisturizer at least 1-2 times a day to the entire body (including -Vanicream, Cetaphil, Aquaphor or Cerave) and moisturize hands after every washing.  -We recommend using moisturizers that come in a tub that needs to be scooped out, not a pump. This has more of an oil base. It will hold moisture in your skin much better than a water base moisturizer. The above recommended are non-pore clogging.        Wear a sunscreen with at least SPF 30 on your face, ears, neck and V of the chest daily. Wear sunscreen on other areas of the body if those areas are exposed to the sun throughout the day. Sunscreens can contain physical and/or chemical blockers. Physical blockers are less likely to clog pores, these include zinc oxide and titanium dioxide. Reapply every two hour and after swimming.      Sunscreen examples: https://www.ewg.org/sunscreen/     UV radiation  UVA radiation remains constant throughout the day and throughout the year. It is a longer wavelength than UVB and therefore penetrates deeper into the skin leading to immediate and delayed tanning, photoaging, and skin cancer. 70-80% of UVA and UVB radiation occurs between the hours of 10am-2pm.  UVB radiation  UVB radiation causes the most harmful effects and is more significant during the summer months. However, snow and ice can reflect UVB radiation leading to skin damage during the winter months as well. UVB radiation is  responsible for tanning, burning, inflammation, delayed erythema (pinkness), pigmentation (brown spots), and skin cancer.      I recommend self monthly full body exams and yearly full body exams with a dermatology provider. If you develop a new or changing lesion please follow up for examination. Most skin cancers are pink and scaly or pink and pearly. However, we do see blue/brown/black skin cancers.  Consider the ABCDEs of melanoma when giving yourself your monthly full body exam ( don't forget the groin, buttocks, feet, toes, etc). A-asymmetry, B-borders, C-color, D-diameter, E-elevation or evolving. If you see any of these changes please follow up in clinic. If you cannot see your back I recommend purchasing a hand held mirror to use with a larger wall mirror.       Checking for Skin Cancer  You can find cancer early by checking your skin each month. There are 3 kinds of skin cancer. They are melanoma, basal cell carcinoma, and squamous cell carcinoma. Doing monthly skin checks is the best way to find new marks or skin changes. Follow the instructions below for checking your skin.   The ABCDEs of checking moles for melanoma   Check your moles or growths for signs of melanoma using ABCDE:   Asymmetry: the sides of the mole or growth don t match  Border: the edges are ragged, notched, or blurred  Color: the color within the mole or growth varies  Diameter: the mole or growth is larger than 6 mm (size of a pencil eraser)  Evolving: the size, shape, or color of the mole or growth is changing (evolving is not shown in the images below)    Checking for other types of skin cancer  Basal cell carcinoma or squamous cell carcinoma have symptoms such as:      A spot or mole that looks different from all other marks on your skin  Changes in how an area feels, such as itching, tenderness, or pain  Changes in the skin's surface, such as oozing, bleeding, or scaliness  A sore that does not heal  New swelling or redness beyond  the border of a mole     Who s at risk?  Anyone can get skin cancer. But you are at greater risk if you have:   Fair skin, light-colored hair, or light-colored eyes  Many moles or abnormal moles on your skin  A history of sunburns from sunlight or tanning beds  A family history of skin cancer  A history of exposure to radiation or chemicals  A weakened immune system  If you have had skin cancer in the past, you are at risk for recurring skin cancer.   How to check your skin  Do your monthly skin checkups in front of a full-length mirror. Check all parts of your body, including your:   Head (ears, face, neck, and scalp)  Torso (front, back, and sides)  Arms (tops, undersides, upper, and lower armpits)  Hands (palms, backs, and fingers, including under the nails)  Buttocks and genitals  Legs (front, back, and sides)  Feet (tops, soles, toes, including under the nails, and between toes)  If you have a lot of moles, take digital photos of them each month. Make sure to take photos both up close and from a distance. These can help you see if any moles change over time.   Most skin changes are not cancer. But if you see any changes in your skin, call your doctor right away. Only he or she can diagnose a problem. If you have skin cancer, seeing your doctor can be the first step toward getting the treatment that could save your life.   [employer] last reviewed this educational content on 4/1/2019 2000-2020 [employer] [address] [place] [address], [place] [address]. All rights reserved. This information is not intended as a substitute for professional medical care. Always follow your healthcare professional's instructions.        When should I call my doctor?  If you are worsening or not improving, please, contact us or seek urgent care as noted below.      Who should I call with questions (adults)?  [facility] (adult and pediatric): [phone]  [facility]  [place] (adult): [phone]  [facility] ([place], [place], [place] and [place]) [phone]  For urgent needs outside of business hours call the [facility] at [phone] and ask for the dermatology resident on call to be paged  If this is a medical emergency and you are unable to reach an ER, Call 911        If you need a prescription refill, please contact your pharmacy. Refills are approved or denied by our Physicians during normal business hours, Monday through Fridays  Per office policy, refills will not be granted if you have not been seen within the past year (or sooner depending on your child's condition)

## 2024-10-15 NOTE — LETTER
10/15/2024       RE: Chapis Zepeda  [address] [place] [address]     Dear Colleague,    Thank you for referring your patient, Chapis Zepeda, to the [facility] [place] [facility]. Please see a copy of my visit note below.    [facility] Dermat[facility] Note  Encounter Date: Oct 15, 2024  Office Visit     Reviewed patients past medical history and pertinent chart review prior to patients visit today.     Dermatology Problem List:  # FBSC 10/15/2024   # Linear epidermal nevus, left leg  - derm surgery referral 10/15/2024     No personal history of skin cancer.  No family history of skin cancer.    Social history: Owns a [employer] company   ____________________________________________    Assessment & Plan:     # Linear epidermal nevus, left leg  - Involvement of the left inguinal crease, left labia majora, and left buttock is bothersome as it catches against clothing. We discussed the option of referral to dermatology surgery to consider excision. We reviewed risks including scarring, infection, and pain. Due to the size of the lesions a stepwise approach may be utilized. The patient expressed interest in referral and this order was placed.     # Multiple nevi, trunk and extremities  # Solar lentigines  - No concerning features on dermoscopy. We discussed the importance of self exams at home. ABCDE criteria and importance of photoprotection reviewed.     # Cherry angiomas  # Seborrheic keratoses  - We discussed the benign nature of the skin lesions. No treatment required. Continued observation recommended. Follow up with any concerns.      Follow-up:  Annual for follow up full body skin exam, as needed for new or changing lesions or new concerns    All risks, benefits and alternatives were discussed with patient.  Patient is in agreement and understands the assessment and plan.  All questions were answered.  Char  DANIEL Pelayo  [facility]    ____________________________________________    CC: Derm Problem (FBSE- Birth areli that goes up entire leg)    HPI:  Ms. Chapis Zepeda is a(n) 44 year old female who presents today as a new patient for a full body skin cancer screening. The patient requests evaluation of a lesion on the left leg. The lesion has been present since birth. The area involving her left groin and buttock has becomes thickened over time. It catches against clothing and is bothersome. No other specific cutaneous concerns today. The patient reports trying to be diligent with photoprotection.      Physical Exam:  Vitals: LMP 09/25/2024 (Exact Date)   SKIN: Total skin excluding the genitalia areas was performed. The exam included the scalp, face, neck, bilateral arms, chest, back, abdomen, bilateral legs, digits, mons pubis, buttocks, and nails.   - Jorgensen II-III.  - The left medial calf and thigh demonstrates tan to brown macules, arranged in a linear fashion. The left inguinal crease, labia majora, and buttock demonstrates verrucous brown patches.   - Multiple tan/brown macules and papules scattered throughout exam, consistent with benign nevi. No concerning features on dermoscopy.   - Scattered tan, homogenous macules scattered on sun exposed skin, consistent with solar lentigines.   - Scattered waxy, stuck on appearing papules and patches, consistent with seborrheic keratoses.    - Several 1-2 mm red dome shaped symmetric papules, consistent with cherry angiomas.     Medications:  Current Outpatient Medications   Medication Sig Dispense Refill     escitalopram (LEXAPRO) 20 MG tablet Take 1 tablet (20 mg) by mouth daily. 90 tablet 3     albuterol (PROAIR HFA/PROVENTIL HFA/VENTOLIN HFA) 108 (90 Base) MCG/ACT inhaler Inhale 2 puffs into the lungs every 6 hours as needed for shortness of breath, wheezing or cough (Patient not taking: Reported on 10/15/2024) 18 g 1     No current  facility-administered medications for this visit.      Past Medical History:   Patient Active Problem List   Diagnosis     Seasonal allergic rhinitis     Past Medical History:   Diagnosis Date     NO ACTIVE PROBLEMS        CC Amy Sullivan NP  [address] [address] on close of this encounter.      Again, thank you for allowing me to participate in the care of your patient.      Sincerely,    Char Pelayo PA-C

## 2024-10-15 NOTE — NURSING NOTE
Dermatology Rooming Note    Chapis Zepeda's goals for this visit include:   Chief Complaint   Patient presents with    Derm Problem     FBSE- Birth areli that goes up entire leg     IRINEO Solis

## 2024-10-17 ENCOUNTER — TELEPHONE (OUTPATIENT)
Dept: DERMATOLOGY | Facility: CLINIC | Age: 44
End: 2024-10-17
Payer: COMMERCIAL

## 2024-10-17 NOTE — TELEPHONE ENCOUNTER
Called and scheduled a consultation with pt for Linear epidermal nevus, left leg     Viola Núñez, Complex  10/17/2024 9:59 AM

## 2024-11-11 ASSESSMENT — ENCOUNTER SYMPTOMS
RESPIRATORY NEGATIVE: 1
GASTROINTESTINAL NEGATIVE: 1
HEMATOLOGIC/LYMPHATIC NEGATIVE: 1
EYES NEGATIVE: 1
CONSTITUTIONAL NEGATIVE: 1
ALLERGIC/IMMUNOLOGIC NEGATIVE: 1
CARDIOVASCULAR NEGATIVE: 1
NEUROLOGICAL NEGATIVE: 1
MUSCULOSKELETAL NEGATIVE: 1
PSYCHIATRIC NEGATIVE: 1

## 2024-11-11 NOTE — PROGRESS NOTES
ANNUAL WELLNESS EXAM     Today's Date: Oct 3, 2024     Patient Chapis Zepeda 1980 presents to the clinic today for a preventative health visit.         SUBJECTIVE     History of Present Illness:  Chapis is going on a trip to [place] in January and is interested in a discussion of what she needs for vaccines and other health needs.  She is aware that she needs a Hep A vaccine.  As we are in the process of closing the clinic, she also is aware that we many not have all the vaccines needed.       Allergies   Allergen Reactions    Seasonal Allergies         Current Outpatient Medications   Medication Instructions    albuterol (PROAIR HFA/PROVENTIL HFA/VENTOLIN HFA) 108 (90 Base) MCG/ACT inhaler 2 puffs, Inhalation, EVERY 6 HOURS PRN    escitalopram (LEXAPRO) 20 mg, Oral, DAILY       Past Medical History:   Diagnosis Date    NO ACTIVE PROBLEMS         Family History   Problem Relation Age of Onset    Anxiety Disorder Father     Depression Father     Substance Abuse Father     Cancer Maternal Grandmother         Lung    Cancer Maternal Grandfather     Diabetes Maternal Grandfather     Heart Disease Paternal Grandfather     Hypertension No family hx of     Hyperlipidemia No family hx of     Breast Cancer No family hx of     Colon Cancer No family hx of     Prostate Cancer No family hx of     Skin Cancer No family hx of             Do you have a first-degree relative with a history of the following:  A. Cancer of the breast or ovaries - No   B. Heart attack, heart pain, or stroke before the age of 55 - No  C. Unexplained death from drowning or car accident - No  D. Osteoporosis or any other significant bone health concerns - No    Social History     Tobacco Use    Smoking status: Never    Smokeless tobacco: Never   Vaping Use    Vaping status: Never Used   Substance Use Topics    Alcohol use: Yes    Drug use: No        History   Sexual Activity    Sexual activity: Yes    Partners: Male             9/27/2021    12:46 PM  "6/10/2022    11:13 AM 2/5/2024     9:07 AM   PHQ   PHQ-9 Total Score 9 10 4   Q9: Thoughts of better off dead/self-harm past 2 weeks Not at all Not at all Not at all        Patient-reported        Immunization History   Administered Date(s) Administered    COVID-19 Bivalent 12+ (Pfizer) 12/14/2022    COVID-19 MONOVALENT 12+ (Pfizer) 11/10/2021    COVID-19 Vaccine (Zenaida) 03/22/2021    Hepatitis A (ADULT 19+) 10/03/2024    Influenza Vaccine >6 months,quad, PF 10/10/2017, 11/29/2018, 10/27/2020, 09/27/2021    TDAP Vaccine (Boostrix) 02/13/2020        Health Maintenance Due   Topic Date Due    ADVANCE CARE PLANNING  Never done    HEPATITIS B IMMUNIZATION (1 of 3 - 19+ 3-dose series) Never done    YEARLY PREVENTIVE VISIT  09/27/2022    MAMMO SCREENING  06/15/2024    INFLUENZA VACCINE (1) 09/01/2024    COVID-19 Vaccine (5 - 2024-25 season) 09/01/2024      Health Maintenance components reviewed - Seasonal Influenza vaccine status is up to date & Covid-19 vaccine status is up to date.    Diet: in general, a \"healthy\" diet      Exercise: 4-5 days/week for an average of 45-60 minutes    Nan has a regular usually 1 day period, method of contraception:  male surgical.      Review of Systems   Constitutional: Negative.    HENT: Negative.     Eyes: Negative.    Respiratory: Negative.     Cardiovascular: Negative.    Gastrointestinal: Negative.    Genitourinary: Negative.    Musculoskeletal: Negative.    Skin:         Multiple moles/freckles     Allergic/Immunologic: Negative.    Neurological: Negative.    Hematological: Negative.    Psychiatric/Behavioral: Negative.              OBJECTIVE     /72 (BP Location: Left arm, Patient Position: Sitting, Cuff Size: Adult Regular)   Pulse 57   Temp 97.7  F (36.5  C) (Oral)   Resp 16   Ht 1.732 m (5' 8.2\")   Wt 75.5 kg (166 lb 8 oz)   LMP 09/25/2024 (Exact Date)   SpO2 97%   BMI 25.17 kg/m       Patient's last menstrual period was 09/25/2024 (exact date).    Estimated body " "mass index is 25.17 kg/m  as calculated from the following:    Height as of this encounter: 1.732 m (5' 8.2\").    Weight as of this encounter: 75.5 kg (166 lb 8 oz).    Complete \"Weight Managment Plan\" in the progress note from the Adult [facility] or Medicare smartsets, use phrase .WEIGHTPLAN, or choose an option from [employer] smartset below.      Labs:  Lab Results   Component Value Date    WBC 5.6 10/03/2024    HGB 13.7 10/03/2024    HCT 40.0 10/03/2024     10/03/2024    CHOL 159 10/03/2024    TRIG 31 10/03/2024    HDL 60 10/03/2024    ALT 17 10/03/2024    AST 21 10/03/2024     10/03/2024    BUN 15.3 10/03/2024    CO2 26 10/03/2024    TSH 1.55 10/03/2024       Physical Exam  Constitutional:       Appearance: Normal appearance. She is normal weight.   HENT:      Head: Normocephalic and atraumatic.      Right Ear: Tympanic membrane, ear canal and external ear normal.      Left Ear: Tympanic membrane, ear canal and external ear normal.      Nose: Nose normal.      Mouth/Throat:      Mouth: Mucous membranes are moist.      Pharynx: Oropharynx is clear.   Eyes:      Extraocular Movements: Extraocular movements intact.      Conjunctiva/sclera: Conjunctivae normal.      Pupils: Pupils are equal, round, and reactive to light.   Cardiovascular:      Rate and Rhythm: Normal rate and regular rhythm.      Pulses: Normal pulses.      Heart sounds: Normal heart sounds.   Pulmonary:      Effort: Pulmonary effort is normal.      Breath sounds: Normal breath sounds.   Chest:   Breasts:     Chung Score is 5.      Right: Normal.      Left: Normal.   Abdominal:      General: Abdomen is flat. Bowel sounds are normal.      Palpations: Abdomen is soft.   Genitourinary:     General: Normal vulva.      Exam position: Lithotomy position.      Chung stage (genital): 5.      Vagina: Normal.      Cervix: Normal.      Uterus: Normal.       Adnexa: Right adnexa normal and left adnexa normal. "   Musculoskeletal:      Cervical back: Normal range of motion and neck supple.   Skin:     General: Skin is warm and dry.      Capillary Refill: Capillary refill takes less than 2 seconds.      Comments: Muitple moles/freckles   Neurological:      General: No focal deficit present.      Mental Status: She is alert and oriented to person, place, and time.   Psychiatric:         Mood and Affect: Mood normal.         Behavior: Behavior normal.         Thought Content: Thought content normal.         Judgment: Judgment normal.               ASSESSMENT/PLAN     (Z00.00) Routine history and physical examination of adult  (primary encounter diagnosis)  Plan: CBC with platelets differential, Comprehensive         metabolic panel, Lipid panel reflex to direct         LDL Fasting, Hemoglobin A1c, TSH with free T4         reflex, MA Screen Bilateral w/David, HPV and         Gynecologic Cytology Panel - Recommended Age         30-65 Years, Gynecologic Cytology (PAP)    (Z71.84) Travel advice encounter  Comment: Hep A vaccine  Follow up with [facility] for more info    (L81.9) Atypical pigmented skin lesion  Comment: Skin check  Plan: Adult Dermatology  Referral    (F41.9) Anxiety  Comment: doing well continue current therapy  Plan: escitalopram (LEXAPRO) 20 MG tablet    (F32.A) Depression, unspecified depression type  Comment: as above  Plan: escitalopram (LEXAPRO) 20 MG tablet     -Discussed/Reinforced healthy diety, lifestyle, exercise and safety.  -Recommended completion of routine dental and eye exam.  -Lab screenings completed today. Results pending.     Advanced Care Planning: Discussed advance care planning with patient; information given to patient to review.     PAP (if applicable): Complete Date of Completion: today   Follow-up Recommendation: as needed  CBE (if applicable): Complete Date of Completion: today Follow-up Recommendation: one year  Mammogram (if applicable):  ordered today  Date of  Completion: Follow-up Recommendation: as needed   Colon cancer screening (if applicable):  at age 45    Cholesterol Screening (if applicable): Complete Date of Completion:   today Follow-up Recommendation: as needed  Diabetes Screening (if applicable): Complete Date of Completion: today Follow-up Recommendation: as needed  Thyroid Screening (if applicable): Complete Date of Completion: today  Follow-up Recommendation: as needed  Depression Screening (if applicable): Complete Date of Completion: today Follow-up Recommendation: one year    Follow-Up:  Follow up in one year, or sooner if needed.     Patient engaged in their plan of care. Patient verbalized understanding and agreed with the final plan.  AVS printed and given to patient.    Amy Sullivan NP   [facility]  Nurse Practitioners [place] [id number] [address] [address]  [phone]

## 2024-11-25 ENCOUNTER — ANCILLARY PROCEDURE (OUTPATIENT)
Dept: MAMMOGRAPHY | Facility: CLINIC | Age: 44
End: 2024-11-25
Attending: NURSE PRACTITIONER
Payer: COMMERCIAL

## 2024-11-25 DIAGNOSIS — Z00.00 ROUTINE HISTORY AND PHYSICAL EXAMINATION OF ADULT: ICD-10-CM

## 2024-11-25 PROCEDURE — 77063 BREAST TOMOSYNTHESIS BI: CPT | Mod: GC | Performed by: STUDENT IN AN ORGANIZED HEALTH CARE EDUCATION/TRAINING PROGRAM

## 2024-11-25 PROCEDURE — 77067 SCR MAMMO BI INCL CAD: CPT | Mod: GC | Performed by: STUDENT IN AN ORGANIZED HEALTH CARE EDUCATION/TRAINING PROGRAM

## 2024-12-03 ENCOUNTER — OFFICE VISIT (OUTPATIENT)
Dept: DERMATOLOGY | Facility: CLINIC | Age: 44
End: 2024-12-03
Payer: COMMERCIAL

## 2024-12-03 DIAGNOSIS — D23.9 LINEAR EPIDERMAL NEVUS: ICD-10-CM

## 2024-12-03 DIAGNOSIS — D48.9 NEOPLASM OF UNCERTAIN BEHAVIOR: ICD-10-CM

## 2024-12-03 PROCEDURE — 88305 TISSUE EXAM BY PATHOLOGIST: CPT | Mod: TC | Performed by: DERMATOLOGY

## 2024-12-03 PROCEDURE — 88305 TISSUE EXAM BY PATHOLOGIST: CPT | Mod: 26 | Performed by: DERMATOLOGY

## 2024-12-03 NOTE — LETTER
12/3/2024       RE: Chapis Zepeda  [address] [place] [address]     Dear Colleague,    Thank you for referring your patient, Chapis Zepeda, to the [facility] [place] at [facility]. Please see a copy of my visit note below.    [facility] Dermatology Note  Encounter Date: Dec 3, 2024  Office Visit     Dermatology Problem List:  # FBSE 10/15/2024   # Linear epidermal nevus, left leg  - S/p Biopsy L inguinal crease 12/3/24    Social history: Owns a landscaping company   ____________________________________________    Assessment & Plan:     # Linear epidermal nevus   Discussed the etiology of this condition as well as potential treatment options which primarily includes excisional surgery which would be done over a period of multiple sessions.   Patient mostly concerned about a few focal raised/verrucous areas of the lesion on the left inguinal fold that are symptomatic. Discussed removing a few of these isolated lesions today with shave biopsy and patient was in agreement with this.   She will contact us if she wants to proceed with additional shave excisions and/or larger excision of a more confluent plaque in the L inguinal fold.   - Shave biopsy performed today on her L inguinal fold, see procedure note below.    Procedures Performed:   - Shave biopsy procedure note, L inguinal crease. After discussion of benefits and risks including but not limited to bleeding, infection, scar, incomplete removal, recurrence, and non-diagnostic biopsy, verbal consent and photographs were obtained. The area was cleaned with isopropyl alcohol. 0.5mL of 1% lidocaine with epinephrine was injected to obtain adequate anesthesia of lesion(s). Shave biopsy at site(s) performed. Hemostasis was achieved with aluminium chloride. Petrolatum ointment and a sterile dressing were applied. The patient tolerated the procedure and no  complications were noted. The patient was provided with verbal and written post care instructions.     Follow-up: prn    Staff and Scribe:     Scribe Disclosure:   I, DEANNA SALAZAR, am serving as a scribe; to document services personally performed by Omi Vazquez MD -based on data collection and the provider's statements to me.     Provider Disclosure:  I agree with above History, Review of Systems, Physical exam and Plan.  I have reviewed the content of the documentation and have edited it as needed. I have personally performed the services documented here and the documentation accurately represents those services and the decisions I have made.      Electronically signed by:    Staff and Resident:    ICruz MD, discussed and evaluated the patient with Dr. Vazquez.    Staff Physician Comments:   I saw and evaluated the patient with the resident (Dr. Cruz Hester) and I agree with the assessment and plan. I was present for the entire minor procedure and examination.    Omi Vazquez DO    Department of Dermatology  [facility]: Phone: [phone], Fax:[phone]  [facility]: Phone: [phone], Fax: [phone]      ____________________________________________    CC: Consult For (Linear epidermal nevus left leg)    HPI:  Ms. Chapis Zepeda is a(n) 44 year old female who presents today in consultation regarding a linear epidermal nevus on her Left leg. Has been present since birth. The majority of this lesion is not symptomatic/bothersome although there are thicker more raised lesions on the left inguinal fold and L labia majora that are itchy and can be painful in the summertime. She has never had any prior excisions on this lesion.  Patient is otherwise feeling well, without additional skin concerns.     Labs Reviewed:  N/A    Physical Exam:  Vitals: There were no  vitals taken for this visit.  SKIN: Focused examination of areas noted below was performed.  - Light brown macules in a blashkoid distribution on the left posterior leg extending into the gluteal fold  - Darker brown macules in a blashkoid distribuition on the left upper medial leg with a more papular/verrucous appearance of the left inguinal fold and labia majora.   - No other lesions of concern on areas examined.              Medications:  Current Outpatient Medications   Medication Sig Dispense Refill     escitalopram (LEXAPRO) 20 MG tablet Take 1 tablet (20 mg) by mouth daily. 90 tablet 3     albuterol (PROAIR HFA/PROVENTIL HFA/VENTOLIN HFA) 108 (90 Base) MCG/ACT inhaler Inhale 2 puffs into the lungs every 6 hours as needed for shortness of breath, wheezing or cough (Patient not taking: Reported on 12/3/2024) 18 g 1     No current facility-administered medications for this visit.      Past Medical History:   Patient Active Problem List   Diagnosis     Seasonal allergic rhinitis     Past Medical History:   Diagnosis Date     NO ACTIVE PROBLEMS        CC Char Pelayo PA-C  [address] [facility], [facility] [address]  [place] [address] on close of this encounter.      Again, thank you for allowing me to participate in the care of your patient.      Sincerely,    Omi Vazquez MD

## 2024-12-03 NOTE — NURSING NOTE
Chief Complaint   Patient presents with    Consult For     Linear epidermal nevus left leg     Nyla KWOK RN-BSN  Dermatology Surgery

## 2024-12-03 NOTE — PROGRESS NOTES
[facility] Dermatology Note  Encounter Date: Dec 3, 2024  Office Visit     Dermatology Problem List:  # FBSE 10/15/2024   # Linear epidermal nevus, left leg  - S/p Biopsy L inguinal crease 12/3/24    Social history: Owns a landscaping company   ____________________________________________    Assessment & Plan:     # Linear epidermal nevus   Discussed the etiology of this condition as well as potential treatment options which primarily includes excisional surgery which would be done over a period of multiple sessions.   Patient mostly concerned about a few focal raised/verrucous areas of the lesion on the left inguinal fold that are symptomatic. Discussed removing a few of these isolated lesions today with shave biopsy and patient was in agreement with this.   She will contact us if she wants to proceed with additional shave excisions and/or larger excision of a more confluent plaque in the L inguinal fold.   - Shave biopsy performed today on her L inguinal fold, see procedure note below.    Procedures Performed:   - Shave biopsy procedure note, L inguinal crease. After discussion of benefits and risks including but not limited to bleeding, infection, scar, incomplete removal, recurrence, and non-diagnostic biopsy, verbal consent and photographs were obtained. The area was cleaned with isopropyl alcohol. 0.5mL of 1% lidocaine with epinephrine was injected to obtain adequate anesthesia of lesion(s). Shave biopsy at site(s) performed. Hemostasis was achieved with aluminium chloride. Petrolatum ointment and a sterile dressing were applied. The patient tolerated the procedure and no complications were noted. The patient was provided with verbal and written post care instructions.     Follow-up: prn    Staff and Scribe:     Scribe Disclosure:   DEANNA PECK, am serving as a scribe; to document services personally performed by Omi Vazquez MD -based on data collection and the provider's  statements to me.     Provider Disclosure:  I agree with above History, Review of Systems, Physical exam and Plan.  I have reviewed the content of the documentation and have edited it as needed. I have personally performed the services documented here and the documentation accurately represents those services and the decisions I have made.      Electronically signed by:    Staff and Resident:    I, Cruz Hester MD, discussed and evaluated the patient with Dr. Vazquez.    Staff Physician Comments:   I saw and evaluated the patient with the resident (Dr. Cruz Hester) and I agree with the assessment and plan. I was present for the entire minor procedure and examination.    Omi Vazquez DO    Department of Dermatology  [facility]: Phone: [phone], Fax:[phone]  [facility]: Phone: [phone], Fax: [phone]      ____________________________________________    CC: Consult For (Linear epidermal nevus left leg)    HPI:  Ms. Chapis Zepeda is a(n) 44 year old female who presents today in consultation regarding a linear epidermal nevus on her Left leg. Has been present since birth. The majority of this lesion is not symptomatic/bothersome although there are thicker more raised lesions on the left inguinal fold and L labia majora that are itchy and can be painful in the summertime. She has never had any prior excisions on this lesion.  Patient is otherwise feeling well, without additional skin concerns.     Labs Reviewed:  N/A    Physical Exam:  Vitals: There were no vitals taken for this visit.  SKIN: Focused examination of areas noted below was performed.  - Light brown macules in a blashkoid distribution on the left posterior leg extending into the gluteal fold  - Darker brown macules in a blashkoid distribuition on the left upper medial leg with a more papular/verrucous appearance  of the left inguinal fold and labia majora.   - No other lesions of concern on areas examined.              Medications:  Current Outpatient Medications   Medication Sig Dispense Refill    escitalopram (LEXAPRO) 20 MG tablet Take 1 tablet (20 mg) by mouth daily. 90 tablet 3    albuterol (PROAIR HFA/PROVENTIL HFA/VENTOLIN HFA) 108 (90 Base) MCG/ACT inhaler Inhale 2 puffs into the lungs every 6 hours as needed for shortness of breath, wheezing or cough (Patient not taking: Reported on 12/3/2024) 18 g 1     No current facility-administered medications for this visit.      Past Medical History:   Patient Active Problem List   Diagnosis    Seasonal allergic rhinitis     Past Medical History:   Diagnosis Date    NO ACTIVE PROBLEMS        CC Char Pelayo PA-C  [address] [facility] [address], [facility] [address]  [place] [address] on close of this encounter.

## 2024-12-05 LAB
PATH REPORT.COMMENTS IMP SPEC: NORMAL
PATH REPORT.FINAL DX SPEC: NORMAL
PATH REPORT.GROSS SPEC: NORMAL
PATH REPORT.MICROSCOPIC SPEC OTHER STN: NORMAL
PATH REPORT.RELEVANT HX SPEC: NORMAL

## 2025-01-24 ENCOUNTER — TRANSFERRED RECORDS (OUTPATIENT)
Dept: HEALTH INFORMATION MANAGEMENT | Facility: CLINIC | Age: 45
End: 2025-01-24
Payer: COMMERCIAL

## 2025-02-11 ENCOUNTER — LAB (OUTPATIENT)
Dept: LAB | Facility: CLINIC | Age: 45
End: 2025-02-11
Payer: COMMERCIAL

## 2025-02-11 DIAGNOSIS — L57.8 ACTINIC DERMATITIS: ICD-10-CM

## 2025-02-11 DIAGNOSIS — L70.0 ACNE VULGARIS: ICD-10-CM

## 2025-02-11 LAB — POTASSIUM SERPL-SCNC: 4.1 MMOL/L (ref 3.4–5.3)

## 2025-02-11 PROCEDURE — 36415 COLL VENOUS BLD VENIPUNCTURE: CPT

## 2025-02-11 PROCEDURE — 84132 ASSAY OF SERUM POTASSIUM: CPT

## 2025-08-20 ENCOUNTER — TRANSFERRED RECORDS (OUTPATIENT)
Dept: HEALTH INFORMATION MANAGEMENT | Facility: CLINIC | Age: 45
End: 2025-08-20
Payer: COMMERCIAL